# Patient Record
Sex: FEMALE | Race: WHITE | NOT HISPANIC OR LATINO | Employment: FULL TIME | URBAN - METROPOLITAN AREA
[De-identification: names, ages, dates, MRNs, and addresses within clinical notes are randomized per-mention and may not be internally consistent; named-entity substitution may affect disease eponyms.]

---

## 2019-07-15 DIAGNOSIS — Z34.90 EARLY STAGE OF PREGNANCY: Primary | ICD-10-CM

## 2019-07-16 ENCOUNTER — PROCEDURE VISIT (OUTPATIENT)
Dept: OBSTETRICS AND GYNECOLOGY | Facility: CLINIC | Age: 24
End: 2019-07-16
Payer: COMMERCIAL

## 2019-07-16 DIAGNOSIS — Z34.90 EARLY STAGE OF PREGNANCY: Primary | ICD-10-CM

## 2019-07-16 DIAGNOSIS — Z34.81 ENCOUNTER FOR SUPERVISION OF OTHER NORMAL PREGNANCY IN FIRST TRIMESTER: ICD-10-CM

## 2019-07-16 PROBLEM — N80.00 ENDOMETRIOSIS OF UTERUS: Status: ACTIVE | Noted: 2018-07-03

## 2019-07-16 PROCEDURE — 76817 TRANSVAGINAL US OBSTETRIC: CPT | Mod: S$GLB,,, | Performed by: OBSTETRICS & GYNECOLOGY

## 2019-07-16 PROCEDURE — 76817 PR US, OB, TRANSVAG APPROACH: ICD-10-PCS | Mod: S$GLB,,, | Performed by: OBSTETRICS & GYNECOLOGY

## 2019-07-17 LAB
AMPHETAMINES (500): NEGATIVE NG/ML
BARBITURATES (200): NEGATIVE NG/ML
BENZODIAZEPINES (300): NEGATIVE NG/ML
COCAINE (150): NEGATIVE NG/ML
MARIJUANA, THC (50): NEGATIVE NG/ML
OPIATES (2000): NEGATIVE NG/ML
PH: 5.7 (ref 4.5–8)
PHENCYCLIDINE (25): NEGATIVE NG/ML
PROPOXYPHENE (300): NEGATIVE NG/ML
URINE CREATININE D/S: 164.9 MG/DL

## 2019-07-29 ENCOUNTER — ROUTINE PRENATAL (OUTPATIENT)
Dept: OBSTETRICS AND GYNECOLOGY | Facility: CLINIC | Age: 24
End: 2019-07-29
Payer: COMMERCIAL

## 2019-07-29 VITALS
WEIGHT: 134 LBS | HEART RATE: 79 BPM | HEIGHT: 62 IN | BODY MASS INDEX: 24.66 KG/M2 | DIASTOLIC BLOOD PRESSURE: 66 MMHG | SYSTOLIC BLOOD PRESSURE: 97 MMHG

## 2019-07-29 DIAGNOSIS — Z34.81 ENCOUNTER FOR SUPERVISION OF OTHER NORMAL PREGNANCY IN FIRST TRIMESTER: Primary | ICD-10-CM

## 2019-07-29 PROCEDURE — 0500F INITIAL PRENATAL CARE VISIT: CPT | Mod: S$GLB,,, | Performed by: OBSTETRICS & GYNECOLOGY

## 2019-07-29 PROCEDURE — 0500F PR INITIAL PRENATAL CARE VISIT: ICD-10-PCS | Mod: S$GLB,,, | Performed by: OBSTETRICS & GYNECOLOGY

## 2019-07-29 NOTE — PROGRESS NOTES
Subjective:       Patient ID: Dory Armas is a 24 y.o.  at 9w5d   Chief Complaint:  Initial Prenatal Visit      History of Present Illness  here for new ob exam.  Labs and history were reviewed with the patient today  No complaints      OB History  OB History    Para Term  AB Living   3 1 1   1 1   SAB TAB Ectopic Multiple Live Births   1       1      # Outcome Date GA Lbr Connor/2nd Weight Sex Delivery Anes PTL Lv   3 Current            2 Term 06/15/18 38w6d  2.835 kg (6 lb 4 oz) M Vag-Spont   MOE   1 SAB                Review of Systems  nml 1st trimester sx- sob, dec excercixe tolerance, fatigue and nausea  Neg for vag bleed, dc, vomiting, cp, lof, fever, chills, ns, visual changes, swelling, headaches, constipation/diarrhea, dysuria, freq/urgency of urination     Objective:     Vitals:    19 1310   BP: 97/66   Pulse: 79       NAD  NCAT  pupils normal size  Skin nml no rashes or lesions  No resp distress, resp even and unlabored  nt nd, no rebound no guarding  nml ext fem gent, normal cvx uterus and adnexa, no dc or bleeding  nml appearing rectum  No cyanosis or clubbing, edema appropriate for pregn    Uterus size approp for gest age         Assessment:        1. Encounter for supervision of other normal pregnancy in first trimester               Plan:      Encounter for supervision of other normal pregnancy in first trimester  -     Urine culture  -     CT/NG, T. vaginalis; Future; Expected date: 2019           Pain fever bleeding precautions  Encouraged PNV  rtc 4 wks

## 2019-07-30 LAB
ABS NRBC COUNT: 0 X 10 3/UL (ref 0–0.01)
ABSOLUTE BASOPHIL: 0.03 X 10 3/UL (ref 0–0.22)
ABSOLUTE EOSINOPHIL: 0.08 X 10 3/UL (ref 0.04–0.54)
ABSOLUTE IMMATURE GRAN: 0.05 X 10 3/UL (ref 0–0.04)
ABSOLUTE LYMPHOCYTE: 2.38 X 10 3/UL (ref 0.86–4.75)
ABSOLUTE MONOCYTE: 0.82 X 10 3/UL (ref 0.22–1.08)
ANTIBODY SCREEN: NEGATIVE
BASOPHILS NFR BLD: 0.3 %
BLOOD GROUPING: NORMAL
BLOOD TYPE (D): POSITIVE
EOSINOPHIL NFR BLD: 0.9 %
HBV SURFACE AG SERPL QL IA: NONREACTIVE
HCT VFR BLD AUTO: 40.3 % (ref 37–47)
HGB BLD-MCNC: 13.6 G/DL (ref 12–16)
HIV 1+2 AB+HIV1 P24 AG SERPL QL IA: NONREACTIVE
IMMATURE GRANULOCYTES: 0.5 % (ref 0–0.5)
LYMPHOCYTES NFR BLD: 25.4 %
MCH RBC QN AUTO: 29.8 PG (ref 27–32)
MCHC RBC AUTO-ENTMCNC: 33.7 G/DL (ref 32–36)
MCV RBC AUTO: 88.2 FL (ref 82–100)
MONOCYTES NFR BLD: 8.8 %
NEUTROPHILS ABSOLUTE COUNT: 6.01 X 10 3/UL (ref 2.15–7.56)
NEUTROPHILS NFR BLD: 64.1 %
NUCLEATED RED BLOOD CELLS: 0 /100 WBC (ref 0–0.2)
PLATELET # BLD AUTO: 358 X 10 3/UL (ref 135–400)
RBC # BLD AUTO: 4.57 X 10 6/UL (ref 4.2–5.4)
RDW-SD: 43 FL (ref 37–54)
RUBELLA IGG SCREEN: NORMAL
SYPHILIS TREPONEMAL ANTIBODY: NONREACTIVE
URINE CULTURE, ROUTINE: NORMAL
WBC # BLD: 9.37 X 10 3/UL (ref 4.3–10.8)

## 2019-07-31 LAB
CHLAMYDIA: NEGATIVE
GONORRHEA: NEGATIVE
SOURCE: NORMAL
SOURCE: NORMAL
TRICHOMONAS AMPLIFIED: NEGATIVE

## 2019-08-27 ENCOUNTER — ROUTINE PRENATAL (OUTPATIENT)
Dept: OBSTETRICS AND GYNECOLOGY | Facility: CLINIC | Age: 24
End: 2019-08-27
Payer: COMMERCIAL

## 2019-08-27 VITALS
WEIGHT: 134 LBS | SYSTOLIC BLOOD PRESSURE: 101 MMHG | DIASTOLIC BLOOD PRESSURE: 70 MMHG | HEART RATE: 93 BPM | BODY MASS INDEX: 24.51 KG/M2

## 2019-08-27 DIAGNOSIS — Z34.82 ENCOUNTER FOR SUPERVISION OF OTHER NORMAL PREGNANCY IN SECOND TRIMESTER: Primary | ICD-10-CM

## 2019-08-27 PROCEDURE — 0502F PR SUBSEQUENT PRENATAL CARE: ICD-10-PCS | Mod: CPTII,S$GLB,, | Performed by: OBSTETRICS & GYNECOLOGY

## 2019-08-27 PROCEDURE — 0502F SUBSEQUENT PRENATAL CARE: CPT | Mod: CPTII,S$GLB,, | Performed by: OBSTETRICS & GYNECOLOGY

## 2019-09-04 ENCOUNTER — ROUTINE PRENATAL (OUTPATIENT)
Dept: OBSTETRICS AND GYNECOLOGY | Facility: CLINIC | Age: 24
End: 2019-09-04
Payer: COMMERCIAL

## 2019-09-04 VITALS
BODY MASS INDEX: 24.69 KG/M2 | DIASTOLIC BLOOD PRESSURE: 61 MMHG | SYSTOLIC BLOOD PRESSURE: 94 MMHG | WEIGHT: 135 LBS | HEART RATE: 80 BPM

## 2019-09-04 DIAGNOSIS — O46.92 VAGINAL BLEEDING IN PREGNANCY, SECOND TRIMESTER: Primary | ICD-10-CM

## 2019-09-04 PROCEDURE — 0502F PR SUBSEQUENT PRENATAL CARE: ICD-10-PCS | Mod: CPTII,S$GLB,, | Performed by: OBSTETRICS & GYNECOLOGY

## 2019-09-04 PROCEDURE — 0502F SUBSEQUENT PRENATAL CARE: CPT | Mod: CPTII,S$GLB,, | Performed by: OBSTETRICS & GYNECOLOGY

## 2019-09-04 NOTE — PROGRESS NOTES
Subjective:       Patient ID: Dory Armas is a 24 y.o.  at 14w0d      Chief Complaint:  Routine Prenatal Visit      History of Present Illness  No complaints. Labs and history reviewed with pt.       Review of Systems  Denies n/v, f/c, dysuria, contractions,   VD, VB, round ligament pain, headaches       Objective:     Vitals:    19 1301   BP: 101/70   Pulse: 93     Wt Readings from Last 3 Encounters:   19 60.8 kg (134 lb)   19 60.8 kg (134 lb)        nad  NCAT  pupils normal size  Skin nml no rashes or lesions  No resp distress, resp even and unlabored  Gravid nt, no rebound no guarding  No cyanosis or clubbing, edema appropriate for pregn    FHT: 140s        Assessment:        1. Encounter for supervision of other normal pregnancy in second trimester                Plan:        Encouraged PNV  Pain, fever, bleeding precautions   RTC 4 weeks

## 2019-09-04 NOTE — PROGRESS NOTES
Subjective:       Patient ID: Dory Armas is a 24 y.o.  at 15w0d     Chief Complaint:  Routine Prenatal Visit      History of Present Illness  Presents for vaginal bleeding x 2 days. + cramping. Seen in ED with + FHTs and no evidence of SAB.       GYN & OB History  OB History    Para Term  AB Living   3 1 1   1 1   SAB TAB Ectopic Multiple Live Births   1       1      # Outcome Date GA Lbr Connor/2nd Weight Sex Delivery Anes PTL Lv   3 Current            2 Term 06/15/18 38w6d  2.835 kg (6 lb 4 oz) M Vag-Spont   MOE   1 SAB                  Review of Systems  Denies n/v, f/c, dysuria, contractions, round ligament pain, headaches       Objective:    Physical Exam:   Constitutional: She is oriented to person, place, and time. She appears well-developed and well-nourished. No distress.    HENT:   Head: Normocephalic and atraumatic.      Cardiovascular: Normal rate and regular rhythm.  Exam reveals no clubbing, no cyanosis and no edema.          Abdominal: Soft. Bowel sounds are normal. She exhibits no distension. There is no tenderness. There is no rebound and no guarding.     Genitourinary: Uterus is not tender. Cervix is normal. There is bleeding in the vagina. No erythema in the vagina. No vaginal discharge found. Cervix exhibits no motion tenderness and no friability.           Musculoskeletal: She exhibits no edema.       Neurological: She is alert and oriented to person, place, and time.    Skin: Skin is warm and dry. No cyanosis or erythema. Nails show no clubbing.    Psychiatric: She has a normal mood and affect. Her behavior is normal.              FHT: 160s     US: Normal FHTS, small subchorionic hemorrhage. Low lying placenta   Assessment:        1. Vaginal bleeding in pregnancy, second trimester                Plan:      US with subchorionic hemorrhage    RH confirmed   Pain, fever, bleeding precautions   RTC 2 weeks

## 2019-09-19 ENCOUNTER — ROUTINE PRENATAL (OUTPATIENT)
Dept: OBSTETRICS AND GYNECOLOGY | Facility: CLINIC | Age: 24
End: 2019-09-19
Payer: COMMERCIAL

## 2019-09-19 VITALS
HEART RATE: 80 BPM | BODY MASS INDEX: 24.87 KG/M2 | WEIGHT: 136 LBS | DIASTOLIC BLOOD PRESSURE: 66 MMHG | SYSTOLIC BLOOD PRESSURE: 99 MMHG

## 2019-09-19 DIAGNOSIS — Z34.82 ENCOUNTER FOR SUPERVISION OF OTHER NORMAL PREGNANCY IN SECOND TRIMESTER: Primary | ICD-10-CM

## 2019-09-19 PROCEDURE — 0502F PR SUBSEQUENT PRENATAL CARE: ICD-10-PCS | Mod: CPTII,S$GLB,, | Performed by: OBSTETRICS & GYNECOLOGY

## 2019-09-19 PROCEDURE — 0502F SUBSEQUENT PRENATAL CARE: CPT | Mod: CPTII,S$GLB,, | Performed by: OBSTETRICS & GYNECOLOGY

## 2019-09-19 RX ORDER — FERROUS SULFATE 325(65) MG
325 TABLET ORAL
COMMUNITY
End: 2021-01-05

## 2019-09-19 RX ORDER — HYDROXYZINE HYDROCHLORIDE 50 MG/1
50 TABLET, FILM COATED ORAL 4 TIMES DAILY
Qty: 30 TABLET | Refills: 5 | Status: SHIPPED | OUTPATIENT
Start: 2019-09-19 | End: 2020-06-19

## 2019-09-19 NOTE — PROGRESS NOTES
Subjective:       Patient ID: Dory Armas is a 24 y.o.  at 17w1d      Chief Complaint:  Routine Prenatal Visit      History of Present Illness  No complaints. Still spotting occasionally. Labs and history reviewed with pt.       Review of Systems  Denies n/v, f/c, dysuria, contractions,   VD, VB, round ligament pain, headaches       Objective:     Vitals:    19 1307   BP: 99/66   Pulse: 80     Wt Readings from Last 3 Encounters:   19 61.7 kg (136 lb)   19 61.2 kg (135 lb)   19 60.8 kg (134 lb)        nad  NCAT  pupils normal size  Skin nml no rashes or lesions  No resp distress, resp even and unlabored  Gravid nt, no rebound no guarding  No cyanosis or clubbing, edema appropriate for pregn    FHT: 140s        Assessment:        1. Encounter for supervision of other normal pregnancy in second trimester                Plan:      Prenatal testing - quad today   Anatomy on RTC   Encouraged PNV  Pain, fever, bleeding precautions   RTC 4 weeks

## 2019-09-30 LAB
ABS NRBC COUNT: 0 X 10 3/UL (ref 0–0.01)
ABSOLUTE BASOPHIL: 0.03 X 10 3/UL (ref 0–0.22)
ABSOLUTE EOSINOPHIL: 0.08 X 10 3/UL (ref 0.04–0.54)
ABSOLUTE IMMATURE GRAN: 0.05 X 10 3/UL (ref 0–0.04)
ABSOLUTE LYMPHOCYTE: 1.95 X 10 3/UL (ref 0.86–4.75)
ABSOLUTE MONOCYTE: 0.67 X 10 3/UL (ref 0.22–1.08)
BASOPHILS NFR BLD: 0.3 % (ref 0.2–1.2)
EOSINOPHIL NFR BLD: 0.9 % (ref 0.7–7)
HCT VFR BLD AUTO: 35.7 % (ref 37–47)
HGB BLD-MCNC: 11.9 G/DL (ref 12–16)
IMMATURE GRANULOCYTES: 0.6 % (ref 0–0.5)
LYMPHOCYTES NFR BLD: 22.5 % (ref 19.3–53.1)
Lab: NORMAL
MCH RBC QN AUTO: 29.9 PG (ref 27–32)
MCHC RBC AUTO-ENTMCNC: 33.3 G/DL (ref 32–36)
MCV RBC AUTO: 89.7 FL (ref 82–100)
MONOCYTES NFR BLD: 7.7 % (ref 4.7–12.5)
NEUTROPHILS ABSOLUTE COUNT: 5.9 X 10 3/UL (ref 2.15–7.56)
NEUTROPHILS NFR BLD: 68 %
NUCLEATED RED BLOOD CELLS: 0 /100 WBC (ref 0–0.2)
PLATELET # BLD AUTO: 369 X 10 3/UL (ref 135–400)
RBC # BLD AUTO: 3.98 X 10 6/UL (ref 4.2–5.4)
RDW-SD: 43.1 FL (ref 37–54)
WBC # BLD: 8.68 X 10 3/UL (ref 4.3–10.8)

## 2019-10-01 LAB
AFP MOM: 1.14
AFP, SERUM: 57.5 NG/ML
AGE RISK DOWN SYNDROME: NORMAL
CALC'D GESTATIONAL AGE: 18.7 WEEKS
CIGARETTE SMOKER: NO
COMMENT: NORMAL
DONOR AGE: EGG RETRIEVAL: NORMAL
DONOR EGG: NO
EDD DETERMINED BY: NORMAL
EST'D DATE OF DELIVERY: NORMAL
ESTRIOL MOM: 1.45
ESTRIOL, FREE: 2.16 NG/ML
HCG MOM: 1.04
HCG, SERUM: 24.45 IU/ML
HX OF NEURAL TUBE DEFECTS: NO
INHIBIN A MOM: 1.17
INHIBIN A, DIMERIC: 210 PG/ML
INSULIN DEPEND DIABETIC: NO
INTERPRETATION: NORMAL
Lab: NORMAL
MATERNAL WEIGHT: 136 LBS
MOTHER'S ETHNIC ORIGIN: NORMAL
MSS DOWN SYNDROME RISK: NORMAL
MSS TRISOMY 18 RISK: NORMAL
NUMBER OF FETUSES: 1
OTHER ETHNIC INFORMATION: NORMAL
PREV PREGNANCY DOWN SYND: NO
REPEAT SPECIMEN: NO
RISK FOR ONTD: NORMAL

## 2019-10-09 ENCOUNTER — ROUTINE PRENATAL (OUTPATIENT)
Dept: OBSTETRICS AND GYNECOLOGY | Facility: CLINIC | Age: 24
End: 2019-10-09
Payer: COMMERCIAL

## 2019-10-09 VITALS
DIASTOLIC BLOOD PRESSURE: 66 MMHG | HEART RATE: 90 BPM | SYSTOLIC BLOOD PRESSURE: 101 MMHG | BODY MASS INDEX: 25.06 KG/M2 | WEIGHT: 137 LBS

## 2019-10-09 DIAGNOSIS — Z34.82 ENCOUNTER FOR SUPERVISION OF OTHER NORMAL PREGNANCY IN SECOND TRIMESTER: ICD-10-CM

## 2019-10-09 DIAGNOSIS — O46.92 VAGINAL BLEEDING IN PREGNANCY, SECOND TRIMESTER: Primary | ICD-10-CM

## 2019-10-09 PROCEDURE — 76815 OB US LIMITED FETUS(S): CPT | Mod: S$GLB,,, | Performed by: OBSTETRICS & GYNECOLOGY

## 2019-10-09 PROCEDURE — 90471 FLU VACCINE (QUAD) GREATER THAN OR EQUAL TO 3YO PRESERVATIVE FREE IM: ICD-10-PCS | Mod: S$GLB,,, | Performed by: OBSTETRICS & GYNECOLOGY

## 2019-10-09 PROCEDURE — 0502F SUBSEQUENT PRENATAL CARE: CPT | Mod: CPTII,S$GLB,, | Performed by: OBSTETRICS & GYNECOLOGY

## 2019-10-09 PROCEDURE — 90686 FLU VACCINE (QUAD) GREATER THAN OR EQUAL TO 3YO PRESERVATIVE FREE IM: ICD-10-PCS | Mod: S$GLB,,, | Performed by: OBSTETRICS & GYNECOLOGY

## 2019-10-09 PROCEDURE — 0502F PR SUBSEQUENT PRENATAL CARE: ICD-10-PCS | Mod: CPTII,S$GLB,, | Performed by: OBSTETRICS & GYNECOLOGY

## 2019-10-09 PROCEDURE — 90471 IMMUNIZATION ADMIN: CPT | Mod: S$GLB,,, | Performed by: OBSTETRICS & GYNECOLOGY

## 2019-10-09 PROCEDURE — 90686 IIV4 VACC NO PRSV 0.5 ML IM: CPT | Mod: S$GLB,,, | Performed by: OBSTETRICS & GYNECOLOGY

## 2019-10-09 PROCEDURE — 76815 PR  US,PREGNANT UTERUS,LIMITED, 1/> FETUSES: ICD-10-PCS | Mod: S$GLB,,, | Performed by: OBSTETRICS & GYNECOLOGY

## 2019-10-09 NOTE — PROGRESS NOTES
Subjective:       Patient ID: Dory Armas is a 24 y.o.  at 20w0d     Chief Complaint:  Routine Prenatal Visit      History of Present Illness  Presents for vaginal bleeding x 1 day.  Denies cramping. Denies intercourse.       Review of Systems  Denies n/v, f/c, dysuria, contractions, round ligament pain, headaches       Objective:    Physical Exam:   Constitutional: She is oriented to person, place, and time. She appears well-developed and well-nourished. No distress.    HENT:   Head: Normocephalic and atraumatic.      Cardiovascular: Normal rate and regular rhythm.  Exam reveals no clubbing, no cyanosis and no edema.          Abdominal: Soft. Bowel sounds are normal. She exhibits no distension. There is no tenderness. There is no rebound and no guarding.     Genitourinary: Uterus is not tender. Cervix is normal. There is bleeding in the vagina. No erythema in the vagina. No vaginal discharge found. Cervix exhibits no motion tenderness and no friability.           Musculoskeletal: She exhibits no edema.       Neurological: She is alert and oriented to person, place, and time.    Skin: Skin is warm and dry. No cyanosis or erythema. Nails show no clubbing.    Psychiatric: She has a normal mood and affect. Her behavior is normal.            FHT: 150s    US: normal. FHTs present. No evidence of subchorionic hemorrhage     Assessment:        1. Vaginal bleeding in pregnancy, second trimester                Plan:      Pt given reassurance about bleeding   RH confirmed   Pain, fever, bleeding precautions   RTC 2 weeks

## 2019-10-21 DIAGNOSIS — Z34.82 ENCOUNTER FOR SUPERVISION OF OTHER NORMAL PREGNANCY IN SECOND TRIMESTER: Primary | ICD-10-CM

## 2019-10-23 ENCOUNTER — PROCEDURE VISIT (OUTPATIENT)
Dept: OBSTETRICS AND GYNECOLOGY | Facility: CLINIC | Age: 24
End: 2019-10-23
Payer: COMMERCIAL

## 2019-10-23 DIAGNOSIS — Z34.82 ENCOUNTER FOR SUPERVISION OF OTHER NORMAL PREGNANCY IN SECOND TRIMESTER: ICD-10-CM

## 2019-10-23 PROCEDURE — 76805 OB US >/= 14 WKS SNGL FETUS: CPT | Mod: S$GLB,,, | Performed by: OBSTETRICS & GYNECOLOGY

## 2019-10-23 PROCEDURE — 76805 PR US, OB 14+WKS, TRANSABD, SINGLE GESTATION: ICD-10-PCS | Mod: S$GLB,,, | Performed by: OBSTETRICS & GYNECOLOGY

## 2019-11-07 ENCOUNTER — ROUTINE PRENATAL (OUTPATIENT)
Dept: OBSTETRICS AND GYNECOLOGY | Facility: CLINIC | Age: 24
End: 2019-11-07
Payer: COMMERCIAL

## 2019-11-07 VITALS
BODY MASS INDEX: 25.06 KG/M2 | HEART RATE: 97 BPM | WEIGHT: 137 LBS | SYSTOLIC BLOOD PRESSURE: 90 MMHG | DIASTOLIC BLOOD PRESSURE: 58 MMHG

## 2019-11-07 DIAGNOSIS — Z34.82 ENCOUNTER FOR SUPERVISION OF OTHER NORMAL PREGNANCY IN SECOND TRIMESTER: Primary | ICD-10-CM

## 2019-11-07 PROCEDURE — 0502F SUBSEQUENT PRENATAL CARE: CPT | Mod: CPTII,S$GLB,, | Performed by: OBSTETRICS & GYNECOLOGY

## 2019-11-07 PROCEDURE — 0502F PR SUBSEQUENT PRENATAL CARE: ICD-10-PCS | Mod: CPTII,S$GLB,, | Performed by: OBSTETRICS & GYNECOLOGY

## 2019-11-07 NOTE — PROGRESS NOTES
Subjective:       Patient ID: Dory Armas is a 24 y.o.  at 24w1d      Chief Complaint:  Routine Prenatal Visit      History of Present Illness  No complaints. Labs and history reviewed with pt.       Review of Systems  Denies n/v, f/c, dysuria, contractions,   VD, VB, round ligament pain, headaches       Objective:     Vitals:    19 1102   BP: (!) 90/58   Pulse: 97     Wt Readings from Last 3 Encounters:   19 62.1 kg (137 lb)   10/09/19 62.1 kg (137 lb)   19 61.7 kg (136 lb)        nad  NCAT  pupils normal size  Skin nml no rashes or lesions  No resp distress, resp even and unlabored  Gravid nt, no rebound no guarding  No cyanosis or clubbing, edema appropriate for pregn  FH 24cm   FHT: 140s        Assessment:      No diagnosis found.            Plan:        O'Sul, 3rd tri labs, tdap on RTC    Encouraged PNV  Pain, fever, bleeding precautions   RTC 4 weeks

## 2019-12-05 DIAGNOSIS — Z34.83 ENCOUNTER FOR SUPERVISION OF OTHER NORMAL PREGNANCY IN THIRD TRIMESTER: Primary | ICD-10-CM

## 2019-12-06 ENCOUNTER — ROUTINE PRENATAL (OUTPATIENT)
Dept: OBSTETRICS AND GYNECOLOGY | Facility: CLINIC | Age: 24
End: 2019-12-06
Payer: COMMERCIAL

## 2019-12-06 VITALS
BODY MASS INDEX: 25.97 KG/M2 | DIASTOLIC BLOOD PRESSURE: 67 MMHG | WEIGHT: 142 LBS | HEART RATE: 106 BPM | SYSTOLIC BLOOD PRESSURE: 104 MMHG

## 2019-12-06 DIAGNOSIS — Z34.83 ENCOUNTER FOR SUPERVISION OF OTHER NORMAL PREGNANCY IN THIRD TRIMESTER: Primary | ICD-10-CM

## 2019-12-06 PROCEDURE — 90715 TDAP VACCINE 7 YRS/> IM: CPT | Mod: S$GLB,,, | Performed by: OBSTETRICS & GYNECOLOGY

## 2019-12-06 PROCEDURE — 90471 TDAP VACCINE GREATER THAN OR EQUAL TO 7YO IM: ICD-10-PCS | Mod: S$GLB,,, | Performed by: OBSTETRICS & GYNECOLOGY

## 2019-12-06 PROCEDURE — 0502F PR SUBSEQUENT PRENATAL CARE: ICD-10-PCS | Mod: CPTII,S$GLB,, | Performed by: OBSTETRICS & GYNECOLOGY

## 2019-12-06 PROCEDURE — 90715 TDAP VACCINE GREATER THAN OR EQUAL TO 7YO IM: ICD-10-PCS | Mod: S$GLB,,, | Performed by: OBSTETRICS & GYNECOLOGY

## 2019-12-06 PROCEDURE — 0502F SUBSEQUENT PRENATAL CARE: CPT | Mod: CPTII,S$GLB,, | Performed by: OBSTETRICS & GYNECOLOGY

## 2019-12-06 PROCEDURE — 90471 IMMUNIZATION ADMIN: CPT | Mod: S$GLB,,, | Performed by: OBSTETRICS & GYNECOLOGY

## 2019-12-06 NOTE — PROGRESS NOTES
Subjective:       Patient ID: Dory Armsa is a 24 y.o.  at 28w2d      Chief Complaint:  Routine Prenatal Visit      History of Present Illness  No complaints. Labs and history reviewed with pt.       Review of Systems  Denies n/v, f/c, dysuria, contractions,   VD, VB, round ligament pain, headaches       Objective:     Vitals:    19 1020   BP: 104/67   Pulse: 106     Wt Readings from Last 3 Encounters:   19 64.4 kg (142 lb)   19 62.1 kg (137 lb)   10/09/19 62.1 kg (137 lb)        nad  NCAT  pupils normal size  Skin nml no rashes or lesions  No resp distress, resp even and unlabored  Gravid nt, no rebound no guarding  No cyanosis or clubbing, edema appropriate for pregn  FH 28cm   FHT: 140s        Assessment:        1. Encounter for supervision of other normal pregnancy in third trimester                Plan:        O'Sul, 3rd tri labs, tdap today   Encouraged PNV  Pain, fever, bleeding precautions   RTC 4 weeks

## 2019-12-19 ENCOUNTER — ROUTINE PRENATAL (OUTPATIENT)
Dept: OBSTETRICS AND GYNECOLOGY | Facility: CLINIC | Age: 24
End: 2019-12-19
Payer: COMMERCIAL

## 2019-12-19 VITALS
HEART RATE: 102 BPM | SYSTOLIC BLOOD PRESSURE: 96 MMHG | BODY MASS INDEX: 27.44 KG/M2 | WEIGHT: 150 LBS | DIASTOLIC BLOOD PRESSURE: 62 MMHG

## 2019-12-19 DIAGNOSIS — Z34.83 ENCOUNTER FOR SUPERVISION OF OTHER NORMAL PREGNANCY IN THIRD TRIMESTER: Primary | ICD-10-CM

## 2019-12-19 PROCEDURE — 0502F PR SUBSEQUENT PRENATAL CARE: ICD-10-PCS | Mod: CPTII,S$GLB,, | Performed by: OBSTETRICS & GYNECOLOGY

## 2019-12-19 PROCEDURE — 0502F SUBSEQUENT PRENATAL CARE: CPT | Mod: CPTII,S$GLB,, | Performed by: OBSTETRICS & GYNECOLOGY

## 2019-12-19 NOTE — PROGRESS NOTES
Subjective:       Patient ID: Dory Armas is a 24 y.o.  at 28w2d      Chief Complaint:  Routine Prenatal Visit      History of Present Illness  No complaints. Labs and history reviewed with pt.       Review of Systems  Denies n/v, f/c, dysuria, contractions,   VD, VB, round ligament pain, headaches       Objective:     Vitals:    19 1414   BP: 96/62   Pulse: 102     Wt Readings from Last 3 Encounters:   19 68 kg (150 lb)   19 64.4 kg (142 lb)   19 62.1 kg (137 lb)        nad  NCAT  pupils normal size  Skin nml no rashes or lesions  No resp distress, resp even and unlabored  Gravid nt, no rebound no guarding  No cyanosis or clubbing, edema appropriate for pregn  FH 30cm   FHT: 140s        Assessment:        No diagnosis found.            Plan:        O'Sul, 3rd tri labs today   Encouraged PNV  Pain, fever, bleeding precautions   RTC 4 weeks

## 2020-01-07 ENCOUNTER — ROUTINE PRENATAL (OUTPATIENT)
Dept: OBSTETRICS AND GYNECOLOGY | Facility: CLINIC | Age: 25
End: 2020-01-07
Payer: COMMERCIAL

## 2020-01-07 VITALS
DIASTOLIC BLOOD PRESSURE: 68 MMHG | SYSTOLIC BLOOD PRESSURE: 103 MMHG | HEART RATE: 98 BPM | BODY MASS INDEX: 28.17 KG/M2 | WEIGHT: 154 LBS

## 2020-01-07 DIAGNOSIS — Z34.83 ENCOUNTER FOR SUPERVISION OF OTHER NORMAL PREGNANCY IN THIRD TRIMESTER: Primary | ICD-10-CM

## 2020-01-07 PROCEDURE — 0502F SUBSEQUENT PRENATAL CARE: CPT | Mod: CPTII,S$GLB,, | Performed by: OBSTETRICS & GYNECOLOGY

## 2020-01-07 PROCEDURE — 0502F PR SUBSEQUENT PRENATAL CARE: ICD-10-PCS | Mod: CPTII,S$GLB,, | Performed by: OBSTETRICS & GYNECOLOGY

## 2020-01-09 NOTE — PROGRESS NOTES
Subjective:       Patient ID: Dory Armas is a 24 y.o.  at 32w6d      Chief Complaint:  Routine Prenatal Visit      History of Present Illness  No complaints. Labs and history reviewed with pt.       Review of Systems  Denies n/v, f/c, dysuria, contractions,   VD, VB, round ligament pain, headaches       Objective:     Vitals:    20 1424   BP: 103/68   Pulse: 98     Wt Readings from Last 3 Encounters:   20 69.9 kg (154 lb)   19 68 kg (150 lb)   19 64.4 kg (142 lb)        nad  NCAT  pupils normal size  Skin nml no rashes or lesions  No resp distress, resp even and unlabored  Gravid nt, no rebound no guarding  No cyanosis or clubbing, edema appropriate for pregn  FH 33cm   FHT: 140s        Assessment:        1. Encounter for supervision of other normal pregnancy in third trimester                Plan:        O'Sul, 3rd tri labs today   Encouraged PNV  Pain, fever, bleeding precautions   RTC 2 weeks

## 2020-01-21 ENCOUNTER — ROUTINE PRENATAL (OUTPATIENT)
Dept: OBSTETRICS AND GYNECOLOGY | Facility: CLINIC | Age: 25
End: 2020-01-21
Payer: COMMERCIAL

## 2020-01-21 VITALS
SYSTOLIC BLOOD PRESSURE: 98 MMHG | BODY MASS INDEX: 28.53 KG/M2 | WEIGHT: 156 LBS | DIASTOLIC BLOOD PRESSURE: 64 MMHG | HEART RATE: 98 BPM

## 2020-01-21 DIAGNOSIS — Z34.83 ENCOUNTER FOR SUPERVISION OF OTHER NORMAL PREGNANCY IN THIRD TRIMESTER: Primary | ICD-10-CM

## 2020-01-21 LAB
ABS NRBC COUNT: 0 X 10 3/UL (ref 0–0.01)
ABSOLUTE BASOPHIL: 0.02 X 10 3/UL (ref 0–0.22)
ABSOLUTE EOSINOPHIL: 0.09 X 10 3/UL (ref 0.04–0.54)
ABSOLUTE IMMATURE GRAN: 0.06 X 10 3/UL (ref 0–0.04)
ABSOLUTE LYMPHOCYTE: 1.73 X 10 3/UL (ref 0.86–4.75)
ABSOLUTE MONOCYTE: 0.63 X 10 3/UL (ref 0.22–1.08)
BASOPHILS NFR BLD: 0.2 % (ref 0.2–1.2)
EOSINOPHIL NFR BLD: 1.1 % (ref 0.7–7)
GLUCOSE 1 HR POST 50 GM: 104 MG/DL
HCT VFR BLD AUTO: 30.7 % (ref 37–47)
HGB BLD-MCNC: 10 G/DL (ref 12–16)
HIV 1+2 AB+HIV1 P24 AG SERPL QL IA: NONREACTIVE
IMMATURE GRANULOCYTES: 0.7 % (ref 0–0.5)
LYMPHOCYTES NFR BLD: 20.7 % (ref 19.3–53.1)
MCH RBC QN AUTO: 29.3 PG (ref 27–32)
MCHC RBC AUTO-ENTMCNC: 32.6 G/DL (ref 32–36)
MCV RBC AUTO: 90 FL (ref 82–100)
MONOCYTES NFR BLD: 7.6 % (ref 4.7–12.5)
NEUTROPHILS ABSOLUTE COUNT: 5.81 X 10 3/UL (ref 2.15–7.56)
NEUTROPHILS NFR BLD: 69.7 %
NUCLEATED RED BLOOD CELLS: 0 /100 WBC (ref 0–0.2)
PLATELET # BLD AUTO: 349 X 10 3/UL (ref 135–400)
RBC # BLD AUTO: 3.41 X 10 6/UL (ref 4.2–5.4)
RDW-SD: 44.8 FL (ref 37–54)
SYPHILIS TREPONEMAL ANTIBODY: NONREACTIVE
WBC # BLD: 8.34 X 10 3/UL (ref 4.3–10.8)

## 2020-01-21 PROCEDURE — 0502F PR SUBSEQUENT PRENATAL CARE: ICD-10-PCS | Mod: CPTII,S$GLB,, | Performed by: OBSTETRICS & GYNECOLOGY

## 2020-01-21 PROCEDURE — 0502F SUBSEQUENT PRENATAL CARE: CPT | Mod: CPTII,S$GLB,, | Performed by: OBSTETRICS & GYNECOLOGY

## 2020-01-21 NOTE — PROGRESS NOTES
Subjective:       Patient ID: Dory Armas is a 24 y.o.  at 34w6d      Chief Complaint:  Routine Prenatal Visit      History of Present Illness  No complaints. Labs and history reviewed with pt.       Review of Systems  Denies n/v, f/c, dysuria, contractions,   VD, VB, round ligament pain, headaches       Objective:     Vitals:    20 1417   BP: 98/64   Pulse: 98     Wt Readings from Last 3 Encounters:   20 70.8 kg (156 lb)   20 69.9 kg (154 lb)   19 68 kg (150 lb)        nad  NCAT  pupils normal size  Skin nml no rashes or lesions  No resp distress, resp even and unlabored  Gravid nt, no rebound no guarding  No cyanosis or clubbing, edema appropriate for pregn  FH 34cm   FHT: 140s        Assessment:        No diagnosis found.            Plan:        Encouraged PNV  Pain, fever, bleeding precautions   RTC 2 weeks

## 2020-01-31 ENCOUNTER — ROUTINE PRENATAL (OUTPATIENT)
Dept: OBSTETRICS AND GYNECOLOGY | Facility: CLINIC | Age: 25
End: 2020-01-31
Payer: COMMERCIAL

## 2020-01-31 VITALS
SYSTOLIC BLOOD PRESSURE: 94 MMHG | HEART RATE: 96 BPM | DIASTOLIC BLOOD PRESSURE: 60 MMHG | BODY MASS INDEX: 29.08 KG/M2 | WEIGHT: 159 LBS

## 2020-01-31 DIAGNOSIS — Z34.83 ENCOUNTER FOR SUPERVISION OF OTHER NORMAL PREGNANCY IN THIRD TRIMESTER: Primary | ICD-10-CM

## 2020-01-31 PROCEDURE — 0502F PR SUBSEQUENT PRENATAL CARE: ICD-10-PCS | Mod: CPTII,S$GLB,, | Performed by: OBSTETRICS & GYNECOLOGY

## 2020-01-31 PROCEDURE — 0502F SUBSEQUENT PRENATAL CARE: CPT | Mod: CPTII,S$GLB,, | Performed by: OBSTETRICS & GYNECOLOGY

## 2020-01-31 NOTE — PROGRESS NOTES
Subjective:       Patient ID: Dory Armas is a 24 y.o.  at 36w2d      Chief Complaint:  Routine Prenatal Visit      History of Present Illness  No complaints. Labs and history reviewed with pt.       Review of Systems  Denies n/v, f/c, dysuria, contractions,   VD, VB, round ligament pain, headaches       Objective:     Vitals:    20 1130   BP: 94/60   Pulse: 96     Wt Readings from Last 3 Encounters:   20 72.1 kg (159 lb)   20 70.8 kg (156 lb)   20 69.9 kg (154 lb)        nad  NCAT  pupils normal size  Skin nml no rashes or lesions  No resp distress, resp even and unlabored  Gravid nt, no rebound no guarding  No cyanosis or clubbing, edema appropriate for pregn  FH 36cm   FHT: 140s        Cvx: 2//high     Assessment:        1. Encounter for supervision of other normal pregnancy in third trimester                Plan:        Encouraged PNV  Pain, fever, bleeding precautions   RTC 1 weeks

## 2020-02-05 ENCOUNTER — ROUTINE PRENATAL (OUTPATIENT)
Dept: OBSTETRICS AND GYNECOLOGY | Facility: CLINIC | Age: 25
End: 2020-02-05
Payer: COMMERCIAL

## 2020-02-05 VITALS
BODY MASS INDEX: 28.94 KG/M2 | DIASTOLIC BLOOD PRESSURE: 72 MMHG | SYSTOLIC BLOOD PRESSURE: 103 MMHG | WEIGHT: 158.19 LBS | HEART RATE: 96 BPM

## 2020-02-05 DIAGNOSIS — Z34.83 ENCOUNTER FOR SUPERVISION OF OTHER NORMAL PREGNANCY IN THIRD TRIMESTER: Primary | ICD-10-CM

## 2020-02-05 PROCEDURE — 0502F PR SUBSEQUENT PRENATAL CARE: ICD-10-PCS | Mod: CPTII,S$GLB,, | Performed by: OBSTETRICS & GYNECOLOGY

## 2020-02-05 PROCEDURE — 0502F SUBSEQUENT PRENATAL CARE: CPT | Mod: CPTII,S$GLB,, | Performed by: OBSTETRICS & GYNECOLOGY

## 2020-02-05 NOTE — PROGRESS NOTES
Subjective:       Patient ID: Dory Armas is a 24 y.o.  at 37w0d      Chief Complaint:  Routine Prenatal Visit      History of Present Illness  No complaints. Labs and history reviewed with pt.       Review of Systems  Denies n/v, f/c, dysuria, contractions,   VD, VB, round ligament pain, headaches       Objective:     Vitals:    20 1259   BP: 103/72   Pulse: 96     Wt Readings from Last 3 Encounters:   20 71.8 kg (158 lb 3.2 oz)   20 72.1 kg (159 lb)   20 70.8 kg (156 lb)        nad  NCAT  pupils normal size  Skin nml no rashes or lesions  No resp distress, resp even and unlabored  Gravid nt, no rebound no guarding  No cyanosis or clubbing, edema appropriate for pregn  FH 36cm   FHT: 140s        Cvx: 3/th/high     Assessment:        1. Encounter for supervision of other normal pregnancy in third trimester                Plan:      Membranes stripped   Encouraged PNV  Pain, fever, bleeding precautions   RTC 1 weeks

## 2020-02-06 LAB
APPEARANCE, UA: ABNORMAL
BILIRUB UR QL STRIP: NEGATIVE MG/DL
COLOR UR: ABNORMAL
GLUCOSE (UA): NORMAL MG/DL
GROUP B STREP MOLECULAR: NEGATIVE
HGB UR QL STRIP: NEGATIVE /UL
KETONES UR QL STRIP: NEGATIVE MG/DL
LEUKOCYTE ESTERASE UR QL STRIP: NEGATIVE /UL
NITRITE UR QL STRIP: NEGATIVE
PENICILLIN ALLERGIC: NORMAL
PH UR STRIP: 7 PH (ref 5–9)
PROT UR QL STRIP: NEGATIVE MG/DL
SP GR UR STRIP: 1.01 (ref 1–1.03)
SPECIMEN COLLECTION METHOD, URINE: ABNORMAL
UROBILINOGEN UR STRIP-ACNC: NORMAL MG/DL

## 2020-02-10 ENCOUNTER — ROUTINE PRENATAL (OUTPATIENT)
Dept: OBSTETRICS AND GYNECOLOGY | Facility: CLINIC | Age: 25
End: 2020-02-10
Payer: COMMERCIAL

## 2020-02-10 ENCOUNTER — OUTSIDE PLACE OF SERVICE (OUTPATIENT)
Dept: OBSTETRICS AND GYNECOLOGY | Facility: CLINIC | Age: 25
End: 2020-02-10

## 2020-02-10 VITALS
WEIGHT: 156.38 LBS | BODY MASS INDEX: 28.61 KG/M2 | SYSTOLIC BLOOD PRESSURE: 107 MMHG | HEART RATE: 123 BPM | DIASTOLIC BLOOD PRESSURE: 75 MMHG

## 2020-02-10 DIAGNOSIS — Z34.83 ENCOUNTER FOR SUPERVISION OF OTHER NORMAL PREGNANCY IN THIRD TRIMESTER: Primary | ICD-10-CM

## 2020-02-10 PROCEDURE — 59400 PR FULL ROUT OBSTE CARE,VAGINAL DELIV: ICD-10-PCS | Mod: AT,,, | Performed by: OBSTETRICS & GYNECOLOGY

## 2020-02-10 PROCEDURE — 59400 OBSTETRICAL CARE: CPT | Mod: AT,,, | Performed by: OBSTETRICS & GYNECOLOGY

## 2020-02-10 PROCEDURE — 0502F PR SUBSEQUENT PRENATAL CARE: ICD-10-PCS | Mod: CPTII,S$GLB,, | Performed by: OBSTETRICS & GYNECOLOGY

## 2020-02-10 PROCEDURE — 0502F SUBSEQUENT PRENATAL CARE: CPT | Mod: CPTII,S$GLB,, | Performed by: OBSTETRICS & GYNECOLOGY

## 2020-02-10 NOTE — PROGRESS NOTES
Subjective:       Patient ID: Dory Armas is a 24 y.o.  at 37w5d      Chief Complaint:  Routine Prenatal Visit      History of Present Illness  No complaints. Labs and history reviewed with pt.       Review of Systems  Denies n/v, f/c, dysuria, contractions,   VD, VB, round ligament pain, headaches       Objective:     Vitals:    02/10/20 1349   BP: 107/75   Pulse: (!) 123     Wt Readings from Last 3 Encounters:   02/10/20 70.9 kg (156 lb 6.4 oz)   20 71.8 kg (158 lb 3.2 oz)   20 72.1 kg (159 lb)        nad  NCAT  pupils normal size  Skin nml no rashes or lesions  No resp distress, resp even and unlabored  Gravid nt, no rebound no guarding  No cyanosis or clubbing, edema appropriate for pregn  FH 36cm   FHT: 140s        Cvx: 6/50/high     Assessment:        1. Encounter for supervision of other normal pregnancy in third trimester                Plan:        To labor unit for labor augmentation

## 2020-02-11 PROCEDURE — 99024 PR POST-OP FOLLOW-UP VISIT: ICD-10-PCS | Mod: ,,, | Performed by: OBSTETRICS & GYNECOLOGY

## 2020-02-11 PROCEDURE — 99024 POSTOP FOLLOW-UP VISIT: CPT | Mod: ,,, | Performed by: OBSTETRICS & GYNECOLOGY

## 2020-03-10 ENCOUNTER — POSTPARTUM VISIT (OUTPATIENT)
Dept: OBSTETRICS AND GYNECOLOGY | Facility: CLINIC | Age: 25
End: 2020-03-10
Payer: COMMERCIAL

## 2020-03-10 VITALS
HEIGHT: 62 IN | BODY MASS INDEX: 26.43 KG/M2 | HEART RATE: 84 BPM | WEIGHT: 143.63 LBS | DIASTOLIC BLOOD PRESSURE: 55 MMHG | SYSTOLIC BLOOD PRESSURE: 104 MMHG

## 2020-03-10 PROCEDURE — 3008F PR BODY MASS INDEX (BMI) DOCUMENTED: ICD-10-PCS | Mod: CPTII,S$GLB,, | Performed by: OBSTETRICS & GYNECOLOGY

## 2020-03-10 PROCEDURE — 99213 OFFICE O/P EST LOW 20 MIN: CPT | Mod: 24,S$GLB,, | Performed by: OBSTETRICS & GYNECOLOGY

## 2020-03-10 PROCEDURE — 99213 PR OFFICE/OUTPT VISIT, EST, LEVL III, 20-29 MIN: ICD-10-PCS | Mod: 24,S$GLB,, | Performed by: OBSTETRICS & GYNECOLOGY

## 2020-03-10 PROCEDURE — 3008F BODY MASS INDEX DOCD: CPT | Mod: CPTII,S$GLB,, | Performed by: OBSTETRICS & GYNECOLOGY

## 2020-03-10 RX ORDER — SERTRALINE HYDROCHLORIDE 50 MG/1
50 TABLET, FILM COATED ORAL DAILY
Qty: 30 TABLET | Refills: 2 | Status: SHIPPED | OUTPATIENT
Start: 2020-03-10 | End: 2020-07-01

## 2020-03-10 NOTE — PROGRESS NOTES
Subjective:       Patient ID: Dory Armas 24 y.o.     Chief Complaint:  Pp depression       History of Present Illness  Pt presents for depression sx. States she had pp depression with her last delivery and was feeling similar sx. + depressed mood, difficulty sleeping, crying spells. Denies SI/HI.       Review of Systems  Review of Systems   Constitutional: Negative for activity change, appetite change, chills, diaphoresis and fever.   Eyes: Negative for visual disturbance.   Respiratory: Negative for shortness of breath and wheezing.    Cardiovascular: Negative for chest pain.   Gastrointestinal: Negative for blood in stool, constipation, diarrhea, nausea and vomiting.   Genitourinary: Negative for dysuria, hematuria and menorrhagia.   Neurological: Negative for headaches.   Psychiatric/Behavioral: + depression. The patient is not nervous/anxious.    Breast: Negative for lump, skin changes and tenderness          Objective:    Physical Exam:   Constitutional: She appears well-developed and well-nourished. No distress.    HENT:   Head: Normocephalic and atraumatic.      Cardiovascular: Exam reveals no clubbing and no cyanosis.     Pulmonary/Chest: Effort normal. No respiratory distress.        Abdominal: Soft.. She exhibits no distension. There is no rebound and no guarding.             Musculoskeletal: Normal range of motion and moves all extremeties.        Skin: Skin is warm and dry. She is not diaphoretic. No cyanosis or erythema. Nails show no clubbing.    Psychiatric: She has a normal mood and affect. Her behavior is normal.          Assessment:     SP   Pp depression         Plan:      Start zoloft   Start OCPs for contraception   rtc for 6 wk exam

## 2020-03-12 RX ORDER — NORETHINDRONE ACETATE AND ETHINYL ESTRADIOL .02; 1 MG/1; MG/1
1 TABLET ORAL DAILY
Qty: 30 TABLET | Refills: 11 | Status: SHIPPED | OUTPATIENT
Start: 2020-03-12 | End: 2020-12-23

## 2020-03-23 ENCOUNTER — OFFICE VISIT (OUTPATIENT)
Dept: OBSTETRICS AND GYNECOLOGY | Facility: CLINIC | Age: 25
End: 2020-03-23
Payer: COMMERCIAL

## 2020-03-23 PROCEDURE — 0503F POSTPARTUM CARE VISIT: CPT | Mod: 95,S$GLB,, | Performed by: OBSTETRICS & GYNECOLOGY

## 2020-03-23 PROCEDURE — 0503F PR POSTPARTUM CARE VISIT: ICD-10-PCS | Mod: 95,S$GLB,, | Performed by: OBSTETRICS & GYNECOLOGY

## 2020-03-23 RX ORDER — DICLOFENAC SODIUM 50 MG/1
50 TABLET, DELAYED RELEASE ORAL 2 TIMES DAILY
Qty: 30 TABLET | Refills: 5 | Status: SHIPPED | OUTPATIENT
Start: 2020-03-23 | End: 2022-03-17

## 2020-03-23 NOTE — PROGRESS NOTES
The patient location is: home   The chief complaint leading to consultation is: postpartum visit   Visit type: Virtual visit with synchronous audio and video  Total time spent with patient: 10 mins   Each patient to whom he or she provides medical services by telemedicine is:  (1) informed of the relationship between the physician and patient and the respective role of any other health care provider with respect to management of the patient; and (2) notified that he or she may decline to receive medical services by telemedicine and may withdraw from such care at any time.        Subjective:       Patient ID: Dory Armas is a 24 y.o. female.    Chief Complaint:  No chief complaint on file.      History of Present Illness  Here for 6 wk pp exam sp    Complaints none. Started zoloft last visit for pp depression. Sx improved, denies SI/HI     Review of Systems  Review of Systems   Constitutional: Negative for activity change, appetite change, chills, diaphoresis and fever.   Respiratory: Negative for shortness of breath.    Cardiovascular: Negative for chest pain.   Gastrointestinal: Negative for abdominal pain, bloating, constipation, nausea and vomiting.   Genitourinary: Negative for dysuria, flank pain, hematuria and menorrhagia.   Integumentary:  Negative for breast mass, breast skin changes and breast tenderness.   Neurological: Negative for headaches.   Psychiatric/Behavioral: Negative for depression. The patient is not nervous/anxious.    Breast: Negative for lump, mass, mastodynia, skin changes and tenderness          Objective:    Physical Exam:   Constitutional: She appears well-developed and well-nourished. No distress.    HENT:   Head: Normocephalic.    Eyes: Conjunctivae and EOM are normal.    Neck: Normal range of motion. No tracheal deviation present. No thyromegaly present.     Pulmonary/Chest: Effort normal. No respiratory distress.                  Musculoskeletal: Normal range of motion and moves  all extremeties.        Skin: No rash noted. She is not diaphoretic.    Psychiatric: She has a normal mood and affect. Her behavior is normal. Judgment and thought content normal.          Assessment:     Postpartum  Depression screen nml  Dysmenorrhea   bottle feeding     Plan:   rtc for annual or prn  Contraception OCPs   Diclofenac for pain   Preventative screening utd

## 2020-05-07 ENCOUNTER — PATIENT MESSAGE (OUTPATIENT)
Dept: OBSTETRICS AND GYNECOLOGY | Facility: CLINIC | Age: 25
End: 2020-05-07

## 2020-06-19 DIAGNOSIS — F41.9 ANXIETY: Primary | ICD-10-CM

## 2020-06-19 RX ORDER — HYDROXYZINE HYDROCHLORIDE 50 MG/1
50 TABLET, FILM COATED ORAL 4 TIMES DAILY
Qty: 30 TABLET | Refills: 4 | Status: SHIPPED | OUTPATIENT
Start: 2020-06-19 | End: 2021-01-05 | Stop reason: SDUPTHER

## 2020-06-26 DIAGNOSIS — F41.9 ANXIETY: ICD-10-CM

## 2020-06-26 RX ORDER — HYDROXYZINE HYDROCHLORIDE 50 MG/1
50 TABLET, FILM COATED ORAL 4 TIMES DAILY
Qty: 30 TABLET | Refills: 4 | Status: CANCELLED | OUTPATIENT
Start: 2020-06-26

## 2020-09-29 ENCOUNTER — PATIENT MESSAGE (OUTPATIENT)
Dept: OBSTETRICS AND GYNECOLOGY | Facility: CLINIC | Age: 25
End: 2020-09-29

## 2020-09-29 RX ORDER — FLUCONAZOLE 150 MG/1
TABLET ORAL
Qty: 2 TABLET | Refills: 0 | Status: SHIPPED | OUTPATIENT
Start: 2020-09-29 | End: 2021-06-21

## 2020-12-31 ENCOUNTER — TELEPHONE (OUTPATIENT)
Dept: OBSTETRICS AND GYNECOLOGY | Facility: CLINIC | Age: 25
End: 2020-12-31

## 2021-01-05 ENCOUNTER — OFFICE VISIT (OUTPATIENT)
Dept: OBSTETRICS AND GYNECOLOGY | Facility: CLINIC | Age: 26
End: 2021-01-05
Payer: COMMERCIAL

## 2021-01-05 VITALS
HEART RATE: 80 BPM | WEIGHT: 137 LBS | BODY MASS INDEX: 25.21 KG/M2 | SYSTOLIC BLOOD PRESSURE: 114 MMHG | DIASTOLIC BLOOD PRESSURE: 76 MMHG | HEIGHT: 62 IN

## 2021-01-05 DIAGNOSIS — F41.9 ANXIETY: ICD-10-CM

## 2021-01-05 DIAGNOSIS — Z11.3 SCREENING FOR VENEREAL DISEASE (VD): Primary | ICD-10-CM

## 2021-01-05 DIAGNOSIS — Z30.41 ENCOUNTER FOR SURVEILLANCE OF CONTRACEPTIVE PILLS: ICD-10-CM

## 2021-01-05 PROCEDURE — 99213 PR OFFICE/OUTPT VISIT, EST, LEVL III, 20-29 MIN: ICD-10-PCS | Mod: S$GLB,,, | Performed by: OBSTETRICS & GYNECOLOGY

## 2021-01-05 PROCEDURE — 99213 OFFICE O/P EST LOW 20 MIN: CPT | Mod: S$GLB,,, | Performed by: OBSTETRICS & GYNECOLOGY

## 2021-01-05 PROCEDURE — 3008F PR BODY MASS INDEX (BMI) DOCUMENTED: ICD-10-PCS | Mod: CPTII,S$GLB,, | Performed by: OBSTETRICS & GYNECOLOGY

## 2021-01-05 PROCEDURE — 3008F BODY MASS INDEX DOCD: CPT | Mod: CPTII,S$GLB,, | Performed by: OBSTETRICS & GYNECOLOGY

## 2021-01-05 RX ORDER — NORETHINDRONE ACETATE AND ETHINYL ESTRADIOL .02; 1 MG/1; MG/1
1 TABLET ORAL DAILY
Qty: 21 TABLET | Refills: 11 | Status: SHIPPED | OUTPATIENT
Start: 2021-01-05 | End: 2021-10-21 | Stop reason: SDUPTHER

## 2021-01-05 RX ORDER — HYDROXYZINE HYDROCHLORIDE 50 MG/1
50 TABLET, FILM COATED ORAL 4 TIMES DAILY PRN
Qty: 30 TABLET | Refills: 11 | Status: SHIPPED | OUTPATIENT
Start: 2021-01-05 | End: 2022-06-24 | Stop reason: SDUPTHER

## 2021-02-09 ENCOUNTER — PATIENT MESSAGE (OUTPATIENT)
Dept: OBSTETRICS AND GYNECOLOGY | Facility: CLINIC | Age: 26
End: 2021-02-09

## 2021-02-22 RX ORDER — BUPROPION HYDROCHLORIDE 150 MG/1
150 TABLET, EXTENDED RELEASE ORAL DAILY
Qty: 30 TABLET | Refills: 2 | Status: SHIPPED | OUTPATIENT
Start: 2021-02-22 | End: 2022-05-19

## 2021-03-08 ENCOUNTER — OFFICE VISIT (OUTPATIENT)
Dept: OBSTETRICS AND GYNECOLOGY | Facility: CLINIC | Age: 26
End: 2021-03-08
Payer: COMMERCIAL

## 2021-03-08 PROCEDURE — 99213 PR OFFICE/OUTPT VISIT, EST, LEVL III, 20-29 MIN: ICD-10-PCS | Mod: 95,,, | Performed by: OBSTETRICS & GYNECOLOGY

## 2021-03-08 PROCEDURE — 99213 OFFICE O/P EST LOW 20 MIN: CPT | Mod: 95,,, | Performed by: OBSTETRICS & GYNECOLOGY

## 2021-04-29 ENCOUNTER — TELEPHONE (OUTPATIENT)
Dept: OBSTETRICS AND GYNECOLOGY | Facility: CLINIC | Age: 26
End: 2021-04-29

## 2021-05-05 ENCOUNTER — TELEPHONE (OUTPATIENT)
Dept: OBSTETRICS AND GYNECOLOGY | Facility: CLINIC | Age: 26
End: 2021-05-05

## 2021-05-05 RX ORDER — VALACYCLOVIR HYDROCHLORIDE 1 G/1
1000 TABLET, FILM COATED ORAL DAILY
Qty: 30 TABLET | Refills: 11 | Status: CANCELLED | OUTPATIENT
Start: 2021-05-05 | End: 2021-05-06

## 2021-05-28 ENCOUNTER — OFFICE VISIT (OUTPATIENT)
Dept: OBSTETRICS AND GYNECOLOGY | Facility: CLINIC | Age: 26
End: 2021-05-28
Payer: COMMERCIAL

## 2021-05-28 VITALS
HEART RATE: 74 BPM | WEIGHT: 136 LBS | SYSTOLIC BLOOD PRESSURE: 106 MMHG | BODY MASS INDEX: 25.03 KG/M2 | HEIGHT: 62 IN | DIASTOLIC BLOOD PRESSURE: 69 MMHG

## 2021-05-28 DIAGNOSIS — K58.9 IRRITABLE BOWEL SYNDROME, UNSPECIFIED TYPE: ICD-10-CM

## 2021-05-28 DIAGNOSIS — N80.9 ENDOMETRIOSIS: Primary | ICD-10-CM

## 2021-05-28 PROCEDURE — 3008F BODY MASS INDEX DOCD: CPT | Mod: CPTII,S$GLB,, | Performed by: OBSTETRICS & GYNECOLOGY

## 2021-05-28 PROCEDURE — 99214 PR OFFICE/OUTPT VISIT, EST, LEVL IV, 30-39 MIN: ICD-10-PCS | Mod: S$GLB,,, | Performed by: OBSTETRICS & GYNECOLOGY

## 2021-05-28 PROCEDURE — 99214 OFFICE O/P EST MOD 30 MIN: CPT | Mod: S$GLB,,, | Performed by: OBSTETRICS & GYNECOLOGY

## 2021-05-28 PROCEDURE — 3008F PR BODY MASS INDEX (BMI) DOCUMENTED: ICD-10-PCS | Mod: CPTII,S$GLB,, | Performed by: OBSTETRICS & GYNECOLOGY

## 2021-05-28 RX ORDER — CLINDAMYCIN HYDROCHLORIDE 300 MG/1
CAPSULE ORAL
COMMUNITY
Start: 2021-05-01 | End: 2022-05-19

## 2021-05-28 RX ORDER — MUPIROCIN 20 MG/G
OINTMENT TOPICAL
COMMUNITY
Start: 2021-05-01 | End: 2022-05-19

## 2021-05-28 RX ORDER — VALACYCLOVIR HYDROCHLORIDE 1 G/1
TABLET, FILM COATED ORAL
COMMUNITY
Start: 2021-05-08

## 2021-05-28 RX ORDER — ACYCLOVIR 50 MG/G
CREAM TOPICAL
COMMUNITY
Start: 2021-05-06

## 2021-06-21 ENCOUNTER — TELEPHONE (OUTPATIENT)
Dept: OBSTETRICS AND GYNECOLOGY | Facility: CLINIC | Age: 26
End: 2021-06-21

## 2021-06-21 DIAGNOSIS — B37.9 YEAST INFECTION: Primary | ICD-10-CM

## 2021-06-21 RX ORDER — FLUCONAZOLE 150 MG/1
150 TABLET ORAL DAILY
Qty: 1 TABLET | Refills: 0 | Status: SHIPPED | OUTPATIENT
Start: 2021-06-21 | End: 2021-06-22

## 2021-06-24 ENCOUNTER — PATIENT MESSAGE (OUTPATIENT)
Dept: OBSTETRICS AND GYNECOLOGY | Facility: CLINIC | Age: 26
End: 2021-06-24

## 2021-06-24 DIAGNOSIS — A59.9 TRICHOMONIASIS: Primary | ICD-10-CM

## 2021-06-24 RX ORDER — METRONIDAZOLE 500 MG/1
2000 TABLET ORAL ONCE
Qty: 4 TABLET | Refills: 0 | Status: SHIPPED | OUTPATIENT
Start: 2021-06-24 | End: 2021-06-24

## 2021-06-29 ENCOUNTER — OFFICE VISIT (OUTPATIENT)
Dept: OBSTETRICS AND GYNECOLOGY | Facility: CLINIC | Age: 26
End: 2021-06-29
Payer: COMMERCIAL

## 2021-06-29 VITALS
BODY MASS INDEX: 24.69 KG/M2 | WEIGHT: 135 LBS | SYSTOLIC BLOOD PRESSURE: 103 MMHG | HEART RATE: 89 BPM | DIASTOLIC BLOOD PRESSURE: 72 MMHG

## 2021-06-29 DIAGNOSIS — Z01.411 ENCOUNTER FOR GYNECOLOGICAL EXAMINATION (GENERAL) (ROUTINE) WITH ABNORMAL FINDINGS: Primary | ICD-10-CM

## 2021-06-29 DIAGNOSIS — Z20.2 EXPOSURE TO SEXUALLY TRANSMITTED DISEASE (STD): ICD-10-CM

## 2021-06-29 PROCEDURE — 99395 PREV VISIT EST AGE 18-39: CPT | Mod: S$GLB,,, | Performed by: OBSTETRICS & GYNECOLOGY

## 2021-06-29 PROCEDURE — 3008F BODY MASS INDEX DOCD: CPT | Mod: CPTII,S$GLB,, | Performed by: OBSTETRICS & GYNECOLOGY

## 2021-06-29 PROCEDURE — 3008F PR BODY MASS INDEX (BMI) DOCUMENTED: ICD-10-PCS | Mod: CPTII,S$GLB,, | Performed by: OBSTETRICS & GYNECOLOGY

## 2021-06-29 PROCEDURE — 99395 PR PREVENTIVE VISIT,EST,18-39: ICD-10-PCS | Mod: S$GLB,,, | Performed by: OBSTETRICS & GYNECOLOGY

## 2021-07-08 ENCOUNTER — TELEPHONE (OUTPATIENT)
Dept: OBSTETRICS AND GYNECOLOGY | Facility: CLINIC | Age: 26
End: 2021-07-08

## 2021-07-23 ENCOUNTER — PATIENT MESSAGE (OUTPATIENT)
Dept: OBSTETRICS AND GYNECOLOGY | Facility: CLINIC | Age: 26
End: 2021-07-23

## 2021-07-26 ENCOUNTER — TELEPHONE (OUTPATIENT)
Dept: OBSTETRICS AND GYNECOLOGY | Facility: CLINIC | Age: 26
End: 2021-07-26

## 2021-10-21 DIAGNOSIS — Z30.41 ENCOUNTER FOR SURVEILLANCE OF CONTRACEPTIVE PILLS: ICD-10-CM

## 2021-10-22 RX ORDER — NORETHINDRONE ACETATE AND ETHINYL ESTRADIOL .02; 1 MG/1; MG/1
1 TABLET ORAL DAILY
Qty: 21 TABLET | Refills: 8 | Status: SHIPPED | OUTPATIENT
Start: 2021-10-22 | End: 2022-03-17

## 2022-01-25 DIAGNOSIS — Z34.81 ENCOUNTER FOR SUPERVISION OF OTHER NORMAL PREGNANCY IN FIRST TRIMESTER: Primary | ICD-10-CM

## 2022-01-26 ENCOUNTER — PROCEDURE VISIT (OUTPATIENT)
Dept: OBSTETRICS AND GYNECOLOGY | Facility: CLINIC | Age: 27
End: 2022-01-26
Payer: MEDICAID

## 2022-01-26 DIAGNOSIS — Z34.81 ENCOUNTER FOR SUPERVISION OF OTHER NORMAL PREGNANCY IN FIRST TRIMESTER: ICD-10-CM

## 2022-01-26 PROCEDURE — 76817 TRANSVAGINAL US OBSTETRIC: CPT | Mod: S$GLB,,, | Performed by: OBSTETRICS & GYNECOLOGY

## 2022-01-26 PROCEDURE — 76817 US OB/GYN PROCEDURE (VIEWPOINT): ICD-10-PCS | Mod: S$GLB,,, | Performed by: OBSTETRICS & GYNECOLOGY

## 2022-02-10 DIAGNOSIS — Z34.81 ENCOUNTER FOR SUPERVISION OF OTHER NORMAL PREGNANCY IN FIRST TRIMESTER: Primary | ICD-10-CM

## 2022-02-15 ENCOUNTER — TELEPHONE (OUTPATIENT)
Dept: OBSTETRICS AND GYNECOLOGY | Facility: CLINIC | Age: 27
End: 2022-02-15
Payer: COMMERCIAL

## 2022-02-16 ENCOUNTER — PROCEDURE VISIT (OUTPATIENT)
Dept: OBSTETRICS AND GYNECOLOGY | Facility: CLINIC | Age: 27
End: 2022-02-16
Payer: COMMERCIAL

## 2022-02-16 DIAGNOSIS — Z34.81 ENCOUNTER FOR SUPERVISION OF OTHER NORMAL PREGNANCY IN FIRST TRIMESTER: ICD-10-CM

## 2022-02-16 LAB
AMORPH URATE CRY URNS QL MICRO: NEGATIVE
BACTERIA #/AREA URNS HPF: ABNORMAL /[HPF]
BILIRUB UR QL STRIP: NEGATIVE
CLARITY UR: CLEAR
COLOR UR: YELLOW
EPITHELIAL CELLS: ABNORMAL
GLUCOSE (UA): NEGATIVE MG/DL
KETONES UR QL STRIP: NEGATIVE MG/DL
LEUKOCYTE ESTERASE UR QL STRIP: ABNORMAL
MUCOUS THREADS URNS QL MICRO: NEGATIVE
NITRITE UR QL STRIP: NEGATIVE
OCCULT BLOOD: NEGATIVE
PH, URINE: 8 (ref 5–7.5)
PROT UR QL STRIP: NEGATIVE MG/DL
RBC/HPF: NEGATIVE
SP GR UR STRIP: 1.01 (ref 1–1.03)
UROBILINOGEN, URINE: NORMAL E.U./DL (ref 0–1)
WBC/HPF: ABNORMAL

## 2022-02-16 PROCEDURE — 76801 OB US < 14 WKS SINGLE FETUS: CPT | Mod: S$GLB,,, | Performed by: OBSTETRICS & GYNECOLOGY

## 2022-02-16 PROCEDURE — 76801 US OB/GYN PROCEDURE (VIEWPOINT): ICD-10-PCS | Mod: S$GLB,,, | Performed by: OBSTETRICS & GYNECOLOGY

## 2022-02-24 ENCOUNTER — TELEPHONE (OUTPATIENT)
Dept: OBSTETRICS AND GYNECOLOGY | Facility: CLINIC | Age: 27
End: 2022-02-24
Payer: COMMERCIAL

## 2022-03-11 ENCOUNTER — PATIENT MESSAGE (OUTPATIENT)
Dept: OBSTETRICS AND GYNECOLOGY | Facility: CLINIC | Age: 27
End: 2022-03-11
Payer: COMMERCIAL

## 2022-03-17 ENCOUNTER — ROUTINE PRENATAL (OUTPATIENT)
Dept: OBSTETRICS AND GYNECOLOGY | Facility: CLINIC | Age: 27
End: 2022-03-17
Payer: COMMERCIAL

## 2022-03-17 VITALS
WEIGHT: 118 LBS | HEART RATE: 78 BPM | SYSTOLIC BLOOD PRESSURE: 113 MMHG | BODY MASS INDEX: 21.58 KG/M2 | DIASTOLIC BLOOD PRESSURE: 72 MMHG

## 2022-03-17 DIAGNOSIS — Z3A.12 12 WEEKS GESTATION OF PREGNANCY: ICD-10-CM

## 2022-03-17 DIAGNOSIS — Z11.3 SCREEN FOR SEXUALLY TRANSMITTED DISEASES: ICD-10-CM

## 2022-03-17 DIAGNOSIS — O21.9 NAUSEA AND VOMITING DURING PREGNANCY PRIOR TO 22 WEEKS GESTATION: ICD-10-CM

## 2022-03-17 DIAGNOSIS — Z34.91 FIRST TRIMESTER PREGNANCY: ICD-10-CM

## 2022-03-17 DIAGNOSIS — Z34.81 ENCOUNTER FOR SUPERVISION OF OTHER NORMAL PREGNANCY IN FIRST TRIMESTER: Primary | ICD-10-CM

## 2022-03-17 PROCEDURE — 0500F PR INITIAL PRENATAL CARE VISIT: ICD-10-PCS | Mod: S$GLB,,, | Performed by: STUDENT IN AN ORGANIZED HEALTH CARE EDUCATION/TRAINING PROGRAM

## 2022-03-17 PROCEDURE — 0500F INITIAL PRENATAL CARE VISIT: CPT | Mod: S$GLB,,, | Performed by: STUDENT IN AN ORGANIZED HEALTH CARE EDUCATION/TRAINING PROGRAM

## 2022-03-17 RX ORDER — PROMETHAZINE HYDROCHLORIDE 12.5 MG/1
12.5 TABLET ORAL EVERY 8 HOURS PRN
Qty: 30 TABLET | Refills: 1 | Status: SHIPPED | OUTPATIENT
Start: 2022-03-17

## 2022-03-17 RX ORDER — ONDANSETRON 4 MG/1
4 TABLET, FILM COATED ORAL EVERY 6 HOURS
Qty: 120 TABLET | Refills: 2 | Status: SHIPPED | OUTPATIENT
Start: 2022-03-17 | End: 2022-06-15

## 2022-03-17 NOTE — PROGRESS NOTES
CC: Initial OB visit    HPI:  26 y.o. at 12w5d with EDC of 2022, by 8w Ultrasound  Complains of nausea vomiting.  She reports 5-6 episodes a day.  She has also had a noted 17 lb weight loss.  She reports going to the ED given in her knee and did help outside.  She is currently on any medications other than her pending night.  He has no other complaints today.    ROS:  Negative unless mentioned above    PMH: IBS, endometriosis, ADHD, HSV  PSH: tonsillectomy, eye surgery, oophorectomy, dx lap for endometriosis  OB Hx: : SAB   G2: 2018, , 6lb4oz   G3: , , 5lb9oz   G4: current  SOC: + THC use, denies S/E  FH: denies family history of congenital defects, mental retardation, twins, cystic fibrosis, Down's syndrome, sickle cell, NTD's, cleft lip/palate, cardiac defects, abdominal wall defects, GYN cancers   GYN Hx: past history: HS, denies other STD's,  Positive History of HSV,  Negative History of Abnormal PAP  Review of patient's allergies indicates:   Allergen Reactions    Amoxicillin trihydrate     Potassium clavulanate     Sulfa (sulfonamide antibiotics)      Current Outpatient Medications   Medication Instructions    acyclovir 5% (ZOVIRAX) 5 % Crea No dose, route, or frequency recorded.    buPROPion (WELLBUTRIN SR) 150 mg, Oral, Daily    clindamycin (CLEOCIN) 300 MG capsule No dose, route, or frequency recorded.    hydrOXYzine (ATARAX) 50 mg, Oral, 4 times daily PRN    mupirocin (BACTROBAN) 2 % ointment No dose, route, or frequency recorded.    valACYclovir (VALTREX) 1000 MG tablet No dose, route, or frequency recorded.     PHYSICAL EXAM  Prenatal Vitals  BP: 113/72  Weight: 53.5 kg (118 lb)    Body mass index is 21.58 kg/m².    Wt Readings from Last 3 Encounters:   22 53.5 kg (118 lb)   21 61.2 kg (135 lb)   21 61.7 kg (136 lb)     General: NAD, well developed, well nourished  Psych: alert and oriented to person, time and place, normal affect  HEENT:  normocephalic, atraumatic  Gravid, soft, NT  Skin: warm, dry  Neuro: normal gait, gross motor function intact  Pelvic: NEFG. Normal vaginal mucosa, pink/ruggated, no lesions or discharge. Cvx closed, nonfriable  No cyanosis, clubbing or edema    ASSESSMENT: Patient is a 26 y.o.  at 12w5d with  Patient Active Problem List   Diagnosis    Endometriosis of uterus    Postpartum depression    Irritable bowel syndrome    Endometriosis    Nausea and vomiting during pregnancy prior to 22 weeks gestation     PLAN:  Pt with n/v, will give zofran and phenergan today  Pt to notify clinic if her n/v worsens or does not improve by Monday, possible need for admission  PNL, GC/CZ, PAP - utd  PNV, Iron  Pain, Fever, Bleeding Precautions  ALVAREZ, DORINDA, PreE precautions  Encouraged Breast feeding  F/U in 1 weeks

## 2022-03-23 ENCOUNTER — TELEPHONE (OUTPATIENT)
Dept: OBSTETRICS AND GYNECOLOGY | Facility: CLINIC | Age: 27
End: 2022-03-23
Payer: COMMERCIAL

## 2022-03-24 ENCOUNTER — ROUTINE PRENATAL (OUTPATIENT)
Dept: OBSTETRICS AND GYNECOLOGY | Facility: CLINIC | Age: 27
End: 2022-03-24
Payer: COMMERCIAL

## 2022-03-24 VITALS
SYSTOLIC BLOOD PRESSURE: 103 MMHG | WEIGHT: 119 LBS | DIASTOLIC BLOOD PRESSURE: 71 MMHG | HEART RATE: 65 BPM | BODY MASS INDEX: 21.77 KG/M2

## 2022-03-24 DIAGNOSIS — Z3A.13 13 WEEKS GESTATION OF PREGNANCY: ICD-10-CM

## 2022-03-24 DIAGNOSIS — Z34.82 ENCOUNTER FOR SUPERVISION OF OTHER NORMAL PREGNANCY IN SECOND TRIMESTER: Primary | ICD-10-CM

## 2022-03-24 PROCEDURE — 0502F PR SUBSEQUENT PRENATAL CARE: ICD-10-PCS | Mod: S$GLB,,, | Performed by: STUDENT IN AN ORGANIZED HEALTH CARE EDUCATION/TRAINING PROGRAM

## 2022-03-24 PROCEDURE — 0502F SUBSEQUENT PRENATAL CARE: CPT | Mod: S$GLB,,, | Performed by: STUDENT IN AN ORGANIZED HEALTH CARE EDUCATION/TRAINING PROGRAM

## 2022-03-24 NOTE — PROGRESS NOTES
CC: Follow-Up OB    HPI:   26 y.o.  at 13w5d with EDC of 2022, by 8w Ultrasound, here for her follow up OB visit. Complains of mild vomiting. Pt was seen last week with complaints today multiple episodes of emesis and 17 lb weight loss. She was started on zofran and phenergan, reports great improvement, only has occasional episode of emesis and has gained weight since her last visit.     Pregnancy ROS:  Negative leakage of fluid   Negative vaginal bleeding   Negative headache, vision changes, RUQ pain, epigastric pain  Negative contractions/abdominal pain   Negative pelvic pressure     Physical Exam:  Prenatal Vitals  BP: 103/71  Weight: 54 kg (119 lb)  Urine Glucose: Negative  Urine Albumin: Negative    Wt Readings from Last 3 Encounters:   22 54 kg (119 lb)   22 53.5 kg (118 lb)   21 61.2 kg (135 lb)     Body mass index is 21.77 kg/m².    General: NAD, well developed, well nourished  Psych: alert and oriented to person, time and place, normal affect  HEENT: normocephalic, atraumatic  Abd: Gravid, soft, NT, ND  Skin: warm, dry  Neuro: normal gait, gross motor function intact  No cyanosis, clubbing or edema    Maternal Blood Type: O+/-    ASSESSMENT: 26 y.o.  @ 13w5d with   Patient Active Problem List   Diagnosis    Endometriosis of uterus    Postpartum depression    Irritable bowel syndrome    Endometriosis    Nausea and vomiting during pregnancy prior to 22 weeks gestation     PLAN:  Pt may continue zofran and phenergan PRN  NIPT and MSAFP on RTC  Pain, fever, bleeding precautions  ALVAREZ, DORINDA, PreE precautions  Cont PNV, Iron  Return to clinic in 4 weeks

## 2022-04-05 ENCOUNTER — PATIENT MESSAGE (OUTPATIENT)
Dept: OBSTETRICS AND GYNECOLOGY | Facility: CLINIC | Age: 27
End: 2022-04-05
Payer: COMMERCIAL

## 2022-04-21 ENCOUNTER — ROUTINE PRENATAL (OUTPATIENT)
Dept: OBSTETRICS AND GYNECOLOGY | Facility: CLINIC | Age: 27
End: 2022-04-21
Payer: COMMERCIAL

## 2022-04-21 VITALS
WEIGHT: 121 LBS | BODY MASS INDEX: 22.13 KG/M2 | DIASTOLIC BLOOD PRESSURE: 69 MMHG | HEART RATE: 70 BPM | SYSTOLIC BLOOD PRESSURE: 106 MMHG

## 2022-04-21 DIAGNOSIS — Z3A.17 17 WEEKS GESTATION OF PREGNANCY: ICD-10-CM

## 2022-04-21 DIAGNOSIS — O09.92 SUPERVISION OF HIGH RISK PREGNANCY IN SECOND TRIMESTER: Primary | ICD-10-CM

## 2022-04-21 PROCEDURE — 0502F SUBSEQUENT PRENATAL CARE: CPT | Mod: S$GLB,,, | Performed by: STUDENT IN AN ORGANIZED HEALTH CARE EDUCATION/TRAINING PROGRAM

## 2022-04-21 PROCEDURE — 0502F PR SUBSEQUENT PRENATAL CARE: ICD-10-PCS | Mod: S$GLB,,, | Performed by: STUDENT IN AN ORGANIZED HEALTH CARE EDUCATION/TRAINING PROGRAM

## 2022-04-21 NOTE — PROGRESS NOTES
CC: Follow-Up OB    HPI:   26 y.o.  at 17w5d with EDC of 2022, by 8w Ultrasound, here for her follow up OB visit. Complains of nothing today.    Pregnancy ROS:  Negative leakage of fluid   Negative vaginal bleeding   Negative headache, vision changes, RUQ pain, epigastric pain  Negative contractions/abdominal pain   Negative pelvic pressure     Physical Exam:  Prenatal Vitals  BP: 106/69  Weight: 54.9 kg (121 lb)  Urine Glucose: Negative  Urine Albumin: Negative    Wt Readings from Last 3 Encounters:   22 54.9 kg (121 lb)   22 54 kg (119 lb)   22 53.5 kg (118 lb)     Body mass index is 22.13 kg/m².    General: NAD, well developed, well nourished  Psych: alert and oriented to person, time and place, normal affect  HEENT: normocephalic, atraumatic  Abd: Gravid, soft, NT, ND  Skin: warm, dry  Neuro: normal gait, gross motor function intact  No cyanosis, clubbing or edema    FHTs: 140's    Maternal Blood Type: O+/-    ASSESSMENT: 26 y.o.  @ 17w5d with   Patient Active Problem List   Diagnosis    Endometriosis of uterus    Postpartum depression    Irritable bowel syndrome    Endometriosis    Nausea and vomiting during pregnancy prior to 22 weeks gestation     PLAN:  NIPT and MSAFP today  Pt also to get remainder of her PNL  Anatomy scan at 22 weeks  Pain, fever, bleeding precautions  ALVAREZ, DORINDA, PreE precautions  Cont PNV, Iron  Return to clinic in 4 weeks

## 2022-04-27 LAB
AFP MOM: 0.83
AFP, SERUM: 42.5 NG/ML
CALC'D GESTATIONAL AGE: 18 WEEKS
COMMENT: NORMAL
DONOR AGE: EGG RETRIEVAL: NORMAL
DONOR EGG: NO
EDD DETERMINED BY: NORMAL
EST'D DATE OF DELIVERY: NORMAL
HX OF NEURAL TUBE DEFECTS: NO
INSULIN DEPEND DIABETIC: NO
INTERPRETATION: NORMAL
Lab: NORMAL
MATERNAL WEIGHT: 121 LBS
MOTHER'S ETHNIC ORIGIN: NORMAL
NUMBER OF FETUSES: 1
PREV PREGNANCY DOWN SYND: NO
REPEAT SPECIMEN: NO
RISK FOR ONTD: NORMAL

## 2022-05-19 ENCOUNTER — ROUTINE PRENATAL (OUTPATIENT)
Dept: OBSTETRICS AND GYNECOLOGY | Facility: CLINIC | Age: 27
End: 2022-05-19
Payer: COMMERCIAL

## 2022-05-19 VITALS
BODY MASS INDEX: 22.31 KG/M2 | WEIGHT: 122 LBS | DIASTOLIC BLOOD PRESSURE: 66 MMHG | SYSTOLIC BLOOD PRESSURE: 102 MMHG | HEART RATE: 85 BPM

## 2022-05-19 DIAGNOSIS — O09.92 SUPERVISION OF HIGH RISK PREGNANCY IN SECOND TRIMESTER: Primary | ICD-10-CM

## 2022-05-19 DIAGNOSIS — Z34.82 ENCOUNTER FOR SUPERVISION OF OTHER NORMAL PREGNANCY IN SECOND TRIMESTER: ICD-10-CM

## 2022-05-19 PROCEDURE — 0502F PR SUBSEQUENT PRENATAL CARE: ICD-10-PCS | Mod: CPTII,S$GLB,, | Performed by: OBSTETRICS & GYNECOLOGY

## 2022-05-19 PROCEDURE — 0502F SUBSEQUENT PRENATAL CARE: CPT | Mod: CPTII,S$GLB,, | Performed by: OBSTETRICS & GYNECOLOGY

## 2022-05-19 PROCEDURE — 76805 OB US >/= 14 WKS SNGL FETUS: CPT | Mod: S$GLB,,, | Performed by: OBSTETRICS & GYNECOLOGY

## 2022-05-19 PROCEDURE — 76805 US OB/GYN PROCEDURE (VIEWPOINT): ICD-10-PCS | Mod: S$GLB,,, | Performed by: OBSTETRICS & GYNECOLOGY

## 2022-05-19 NOTE — PROGRESS NOTES
Subjective:       Patient ID: Dory Armas is a 26 y.o.  at 21w5d     Chief Complaint:  Routine Prenatal Visit      History of Present Illness  No complaints. Labs and history reviewed with pt.         Review of Systems  Denies n/v, f/c, dysuria, contractions,   VD, VB, round ligament pain, headaches       Objective:     Vitals:    22 1541   BP: 102/66   Pulse: 85     Wt Readings from Last 3 Encounters:   22 55.3 kg (122 lb)   22 54.9 kg (121 lb)   22 54 kg (119 lb)     nad  NCAT  pupils normal size  Skin nml no rashes or lesions  No resp distress, resp even and unlabored   nt, nd,  no rebound no guarding  No cyanosis or clubbing, edema appropriate for pregn  FHT: 150s   Assessment:        1. Encounter for supervision of other normal pregnancy in second trimester                Plan:      Anatomy normal    Encouraged PNV  Pain, fever, bleeding precautions   RTC 4 weeks

## 2022-06-16 ENCOUNTER — ROUTINE PRENATAL (OUTPATIENT)
Dept: OBSTETRICS AND GYNECOLOGY | Facility: CLINIC | Age: 27
End: 2022-06-16
Payer: MEDICAID

## 2022-06-16 VITALS
BODY MASS INDEX: 22.13 KG/M2 | WEIGHT: 121 LBS | DIASTOLIC BLOOD PRESSURE: 61 MMHG | HEART RATE: 89 BPM | SYSTOLIC BLOOD PRESSURE: 98 MMHG

## 2022-06-16 DIAGNOSIS — O09.92 SUPERVISION OF HIGH RISK PREGNANCY IN SECOND TRIMESTER: Primary | ICD-10-CM

## 2022-06-16 PROCEDURE — 99213 PR OFFICE/OUTPT VISIT, EST, LEVL III, 20-29 MIN: ICD-10-PCS | Mod: TH,S$GLB,, | Performed by: OBSTETRICS & GYNECOLOGY

## 2022-06-16 PROCEDURE — 99213 OFFICE O/P EST LOW 20 MIN: CPT | Mod: TH,S$GLB,, | Performed by: OBSTETRICS & GYNECOLOGY

## 2022-06-16 NOTE — PROGRESS NOTES
Subjective:       Patient ID: Dory Armas is a 27 y.o.  at 25w5d     Chief Complaint:  Routine Prenatal Visit      History of Present Illness  No complaints. Labs and history reviewed with pt.         Review of Systems  Denies n/v, f/c, dysuria, contractions,   VD, VB, round ligament pain, headaches       Objective:     Vitals:    22 1111   BP: 98/61   Pulse: 89     Wt Readings from Last 3 Encounters:   22 54.9 kg (121 lb)   22 55.3 kg (122 lb)   22 54.9 kg (121 lb)     nad  NCAT  pupils normal size  Skin nml no rashes or lesions  No resp distress, resp even and unlabored   nt, nd,  no rebound no guarding  No cyanosis or clubbing, edema appropriate for pregn  FHT: 150s   Assessment:        1. Supervision of high risk pregnancy in second trimester                Plan:      Discussed stopping medical THC due to risks of still birth   Pt declined other anxiety tx at this time   Encouraged PNV  Pain, fever, bleeding precautions   RTC 4 weeks

## 2022-06-23 ENCOUNTER — PATIENT MESSAGE (OUTPATIENT)
Dept: OBSTETRICS AND GYNECOLOGY | Facility: CLINIC | Age: 27
End: 2022-06-23
Payer: MEDICAID

## 2022-06-24 DIAGNOSIS — F41.9 ANXIETY: ICD-10-CM

## 2022-06-24 RX ORDER — SERTRALINE HYDROCHLORIDE 50 MG/1
50 TABLET, FILM COATED ORAL DAILY
Qty: 30 TABLET | Refills: 2 | Status: SHIPPED | OUTPATIENT
Start: 2022-06-24 | End: 2022-06-28 | Stop reason: SDUPTHER

## 2022-06-24 RX ORDER — HYDROXYZINE HYDROCHLORIDE 50 MG/1
25 TABLET, FILM COATED ORAL 4 TIMES DAILY PRN
Qty: 30 TABLET | Refills: 11 | Status: SHIPPED | OUTPATIENT
Start: 2022-06-24 | End: 2023-07-10

## 2022-06-28 RX ORDER — SERTRALINE HYDROCHLORIDE 50 MG/1
50 TABLET, FILM COATED ORAL DAILY
Qty: 30 TABLET | Refills: 2 | Status: SHIPPED | OUTPATIENT
Start: 2022-06-28 | End: 2022-07-14

## 2022-06-29 ENCOUNTER — PATIENT MESSAGE (OUTPATIENT)
Dept: OBSTETRICS AND GYNECOLOGY | Facility: CLINIC | Age: 27
End: 2022-06-29
Payer: MEDICAID

## 2022-06-29 DIAGNOSIS — F41.9 ANXIETY DURING PREGNANCY: Primary | ICD-10-CM

## 2022-06-29 DIAGNOSIS — O99.340 ANXIETY DURING PREGNANCY: Primary | ICD-10-CM

## 2022-06-29 RX ORDER — BUSPIRONE HYDROCHLORIDE 5 MG/1
5 TABLET ORAL 2 TIMES DAILY
Qty: 60 TABLET | Refills: 11 | Status: SHIPPED | OUTPATIENT
Start: 2022-06-29 | End: 2023-06-29

## 2022-06-29 NOTE — TELEPHONE ENCOUNTER
----- Message from Kenya Valdez sent at 6/28/2022  5:37 PM CDT -----    ----- Message -----  From: Coni Roman  Sent: 6/28/2022   4:57 PM CDT  To: Edson Deleon (Obgyn) Staff    Dorycass Armas is calling to get an update on her day time anti anxiety medication. She said her pharmacy still doesn't have anything and that she doesn't want Zoloft. Please give her a fabio back with an update on her request [phone] (home)       [id number] [address] [address]  Phone: [phone] Fax: [phone]    She said she called yesterday and this morning about this.

## 2022-07-13 ENCOUNTER — TELEPHONE (OUTPATIENT)
Dept: OBSTETRICS AND GYNECOLOGY | Facility: CLINIC | Age: 27
End: 2022-07-13
Payer: MEDICAID

## 2022-07-14 ENCOUNTER — ROUTINE PRENATAL (OUTPATIENT)
Dept: OBSTETRICS AND GYNECOLOGY | Facility: CLINIC | Age: 27
End: 2022-07-14
Payer: MEDICAID

## 2022-07-14 VITALS
HEART RATE: 100 BPM | BODY MASS INDEX: 22.5 KG/M2 | SYSTOLIC BLOOD PRESSURE: 97 MMHG | DIASTOLIC BLOOD PRESSURE: 64 MMHG | WEIGHT: 123 LBS

## 2022-07-14 DIAGNOSIS — Z34.83 ENCOUNTER FOR SUPERVISION OF OTHER NORMAL PREGNANCY IN THIRD TRIMESTER: Primary | ICD-10-CM

## 2022-07-14 PROCEDURE — 99213 PR OFFICE/OUTPT VISIT, EST, LEVL III, 20-29 MIN: ICD-10-PCS | Mod: TH,S$GLB,, | Performed by: OBSTETRICS & GYNECOLOGY

## 2022-07-14 PROCEDURE — 99213 OFFICE O/P EST LOW 20 MIN: CPT | Mod: TH,S$GLB,, | Performed by: OBSTETRICS & GYNECOLOGY

## 2022-07-16 LAB — GLUCOSE 1 HR POST 50 GM: 110 MG/DL

## 2022-07-16 NOTE — PROGRESS NOTES
Subjective:       Patient ID: Dory Armas is a 27 y.o.  at 29w6d     Chief Complaint:  Routine Prenatal Visit      History of Present Illness  C/o dizzy and shaky in the am. Has not passed out but feels as though she might. Eating makes it worse.. Labs and history reviewed with pt.         Review of Systems  Denies n/v, f/c, dysuria, contractions,   VD, VB, round ligament pain, headaches       Objective:     Vitals:    22 1131   BP: 97/64   Pulse: 100     Wt Readings from Last 3 Encounters:   22 55.8 kg (123 lb)   22 54.9 kg (121 lb)   22 55.3 kg (122 lb)     nad  NCAT  pupils normal size  Skin nml no rashes or lesions  No resp distress, resp even and unlabored   nt, nd,  no rebound no guarding  No cyanosis or clubbing, edema appropriate for pregn  FHT: 150s   Assessment:        1. Encounter for supervision of other normal pregnancy in third trimester                Plan:      Continue atarax   o'sul encouraged   tdap declined   Encouraged PNV  Pain, fever, bleeding precautions   RTC 2 weeks

## 2022-07-28 ENCOUNTER — TELEPHONE (OUTPATIENT)
Dept: OBSTETRICS AND GYNECOLOGY | Facility: CLINIC | Age: 27
End: 2022-07-28
Payer: MEDICAID

## 2022-07-28 NOTE — TELEPHONE ENCOUNTER
----- Message from Misty Brown sent at 7/28/2022  9:12 AM CDT -----  Contact: self  Requesting a call back regarding patient advice - she is 31 weeks pregnant and says since Sunday she gets a shooting pain across her breast when she inhales - says it's happened 4x since Sunday. Please call back at [phone]

## 2022-07-28 NOTE — TELEPHONE ENCOUNTER
Pt c/o having a headache and body aches, mom has been sick. I advised her that Dr. Sandhu suggest to go to labor and deliver to have a heart rate check and a EKG. Pt states she will go after she gets off work.

## 2022-07-29 ENCOUNTER — PATIENT MESSAGE (OUTPATIENT)
Dept: OBSTETRICS AND GYNECOLOGY | Facility: CLINIC | Age: 27
End: 2022-07-29
Payer: MEDICAID

## 2022-07-29 DIAGNOSIS — R07.1 CHEST PAIN ON BREATHING: Primary | ICD-10-CM

## 2022-07-29 NOTE — TELEPHONE ENCOUNTER
I called pt and let her know that we ordered the EKG and Xray. To go to admitting in the back of the hospital where it says day surgery.

## 2022-08-01 ENCOUNTER — ROUTINE PRENATAL (OUTPATIENT)
Dept: OBSTETRICS AND GYNECOLOGY | Facility: CLINIC | Age: 27
End: 2022-08-01
Payer: MEDICAID

## 2022-08-01 VITALS
DIASTOLIC BLOOD PRESSURE: 66 MMHG | SYSTOLIC BLOOD PRESSURE: 98 MMHG | WEIGHT: 128 LBS | HEART RATE: 105 BPM | BODY MASS INDEX: 23.41 KG/M2

## 2022-08-01 DIAGNOSIS — Z34.83 ENCOUNTER FOR SUPERVISION OF OTHER NORMAL PREGNANCY IN THIRD TRIMESTER: Primary | ICD-10-CM

## 2022-08-01 DIAGNOSIS — R07.1 CHEST PAIN ON BREATHING: ICD-10-CM

## 2022-08-01 PROCEDURE — 99213 PR OFFICE/OUTPT VISIT, EST, LEVL III, 20-29 MIN: ICD-10-PCS | Mod: TH,S$GLB,, | Performed by: OBSTETRICS & GYNECOLOGY

## 2022-08-01 PROCEDURE — 99213 OFFICE O/P EST LOW 20 MIN: CPT | Mod: TH,S$GLB,, | Performed by: OBSTETRICS & GYNECOLOGY

## 2022-08-01 NOTE — PROGRESS NOTES
Subjective:       Patient ID: Dory Armas is a 27 y.o.  at 32w2d      Chief Complaint:  Routine Prenatal Visit      History of Present Illness  C/o headaches and neck pain. Also has been having sharp chest pain on the left side. Always located in the same place, worse with movement and deep breaths.. Labs and history reviewed with pt.         Review of Systems  Denies n/v, f/c, dysuria, contractions,   VD, VB, round ligament pain,      Objective:     Vitals:    22 1055   BP: 98/66   Pulse: 105     Wt Readings from Last 3 Encounters:   22 58.1 kg (128 lb)   22 55.8 kg (123 lb)   22 54.9 kg (121 lb)     nad  NCAT  pupils normal size  Skin nml no rashes or lesions  No resp distress, resp even and unlabored   nt, nd,  no rebound no guarding  No cyanosis or clubbing, edema appropriate for pregn  FHT: 150s   Assessment:        1. Encounter for supervision of other normal pregnancy in third trimester    2. Chest pain on breathing                Plan:      Continue atarax   Recommend heating pad or icy hot for chest pain   cxr and ekg normal   Pt given reassurance- encouraged stretches   tdap declined   Encouraged PNV  Pain, fever, bleeding precautions   RTC 2 weeks

## 2022-08-03 DIAGNOSIS — Z34.83 ENCOUNTER FOR SUPERVISION OF OTHER NORMAL PREGNANCY IN THIRD TRIMESTER: Primary | ICD-10-CM

## 2022-08-05 ENCOUNTER — PROCEDURE VISIT (OUTPATIENT)
Dept: OBSTETRICS AND GYNECOLOGY | Facility: CLINIC | Age: 27
End: 2022-08-05
Payer: MEDICAID

## 2022-08-05 DIAGNOSIS — Z34.83 ENCOUNTER FOR SUPERVISION OF OTHER NORMAL PREGNANCY IN THIRD TRIMESTER: ICD-10-CM

## 2022-08-05 PROCEDURE — 76816 OB US FOLLOW-UP PER FETUS: CPT | Mod: S$GLB,,, | Performed by: OBSTETRICS & GYNECOLOGY

## 2022-08-05 PROCEDURE — 76816 US OB/GYN PROCEDURE (VIEWPOINT): ICD-10-PCS | Mod: S$GLB,,, | Performed by: OBSTETRICS & GYNECOLOGY

## 2022-08-19 ENCOUNTER — ROUTINE PRENATAL (OUTPATIENT)
Dept: OBSTETRICS AND GYNECOLOGY | Facility: CLINIC | Age: 27
End: 2022-08-19
Payer: MEDICAID

## 2022-08-19 VITALS
BODY MASS INDEX: 24.33 KG/M2 | HEART RATE: 101 BPM | SYSTOLIC BLOOD PRESSURE: 106 MMHG | DIASTOLIC BLOOD PRESSURE: 73 MMHG | WEIGHT: 133 LBS

## 2022-08-19 DIAGNOSIS — Z34.83 ENCOUNTER FOR SUPERVISION OF OTHER NORMAL PREGNANCY IN THIRD TRIMESTER: Primary | ICD-10-CM

## 2022-08-19 DIAGNOSIS — O36.5910 POOR FETAL GROWTH AFFECTING MANAGEMENT OF MOTHER IN FIRST TRIMESTER, SINGLE OR UNSPECIFIED FETUS: ICD-10-CM

## 2022-08-19 PROCEDURE — 99213 PR OFFICE/OUTPT VISIT, EST, LEVL III, 20-29 MIN: ICD-10-PCS | Mod: TH,S$GLB,, | Performed by: OBSTETRICS & GYNECOLOGY

## 2022-08-19 PROCEDURE — 99213 OFFICE O/P EST LOW 20 MIN: CPT | Mod: TH,S$GLB,, | Performed by: OBSTETRICS & GYNECOLOGY

## 2022-08-19 NOTE — PROGRESS NOTES
Subjective:       Patient ID: Dory Armas is a 27 y.o.  at 34w6d     Chief Complaint:  Routine Prenatal Visit      History of Present Illness  No complaints. Body aches improved with magnesium. Labs and history reviewed with pt.         Review of Systems  Denies n/v, f/c, dysuria, contractions,   VD, VB, round ligament pain, headaches       Objective:     Vitals:    22 1102   BP: 106/73   Pulse: 101     Wt Readings from Last 3 Encounters:   22 60.3 kg (133 lb)   22 58.1 kg (128 lb)   22 55.8 kg (123 lb)     nad  NCAT  pupils normal size  Skin nml no rashes or lesions  No resp distress, resp even and unlabored   nt, nd,  no rebound no guarding  No cyanosis or clubbing, edema appropriate for pregn  FHT: 150s   Assessment:        1. Encounter for supervision of other normal pregnancy in third trimester    2. Poor fetal growth affecting management of mother in first trimester, single or unspecified fetus                Plan:      GBS today   cvx /high    Encouraged PNV  Pain, fever, bleeding precautions   RTC 1 weeks

## 2022-08-23 LAB
GROUP B STREP MOLECULAR: POSITIVE
PENICILLIN ALLERGIC: NO

## 2022-08-25 ENCOUNTER — ROUTINE PRENATAL (OUTPATIENT)
Dept: OBSTETRICS AND GYNECOLOGY | Facility: CLINIC | Age: 27
End: 2022-08-25
Payer: MEDICAID

## 2022-08-25 VITALS
DIASTOLIC BLOOD PRESSURE: 66 MMHG | WEIGHT: 135 LBS | SYSTOLIC BLOOD PRESSURE: 94 MMHG | BODY MASS INDEX: 24.69 KG/M2 | HEART RATE: 88 BPM

## 2022-08-25 DIAGNOSIS — Z34.83 ENCOUNTER FOR SUPERVISION OF OTHER NORMAL PREGNANCY IN THIRD TRIMESTER: ICD-10-CM

## 2022-08-25 DIAGNOSIS — O36.5910 POOR FETAL GROWTH AFFECTING MANAGEMENT OF MOTHER IN FIRST TRIMESTER, SINGLE OR UNSPECIFIED FETUS: Primary | ICD-10-CM

## 2022-08-25 PROCEDURE — 99213 PR OFFICE/OUTPT VISIT, EST, LEVL III, 20-29 MIN: ICD-10-PCS | Mod: TH,S$GLB,, | Performed by: OBSTETRICS & GYNECOLOGY

## 2022-08-25 PROCEDURE — 99213 OFFICE O/P EST LOW 20 MIN: CPT | Mod: TH,S$GLB,, | Performed by: OBSTETRICS & GYNECOLOGY

## 2022-08-25 NOTE — PROGRESS NOTES
Subjective:       Patient ID: Dory Armas is a 27 y.o.  at 35w5d     Chief Complaint:  Routine Prenatal Visit      History of Present Illness  No complaints. Body aches improved with magnesium. Labs and history reviewed with pt.         Review of Systems  Denies n/v, f/c, dysuria, contractions,   VD, VB, round ligament pain, headaches       Objective:     Vitals:    22 1050   BP: 94/66   Pulse: 88     Wt Readings from Last 3 Encounters:   22 61.2 kg (135 lb)   22 60.3 kg (133 lb)   22 58.1 kg (128 lb)     nad  NCAT  pupils normal size  Skin nml no rashes or lesions  No resp distress, resp even and unlabored   nt, nd,  no rebound no guarding  No cyanosis or clubbing, edema appropriate for pregn  FHT: 150s   Assessment:        1. Poor fetal growth affecting management of mother in first trimester, single or unspecified fetus    2. Encounter for supervision of other normal pregnancy in third trimester                Plan:      GBS pos   cvx //high    Encouraged PNV  Pain, fever, bleeding precautions   RTC 1 weeks   , RTC tomorrow for bpp

## 2022-08-26 ENCOUNTER — PROCEDURE VISIT (OUTPATIENT)
Dept: OBSTETRICS AND GYNECOLOGY | Facility: CLINIC | Age: 27
End: 2022-08-26
Payer: MEDICAID

## 2022-08-26 DIAGNOSIS — O36.5910 POOR FETAL GROWTH AFFECTING MANAGEMENT OF MOTHER IN FIRST TRIMESTER, SINGLE OR UNSPECIFIED FETUS: Primary | ICD-10-CM

## 2022-08-26 DIAGNOSIS — O36.5910 POOR FETAL GROWTH AFFECTING MANAGEMENT OF MOTHER IN FIRST TRIMESTER, SINGLE OR UNSPECIFIED FETUS: ICD-10-CM

## 2022-08-26 PROCEDURE — 76820 UMBILICAL ARTERY ECHO: CPT | Mod: S$GLB,,, | Performed by: OBSTETRICS & GYNECOLOGY

## 2022-08-26 PROCEDURE — 76819 US OB/GYN PROCEDURE (VIEWPOINT): ICD-10-PCS | Mod: S$GLB,,, | Performed by: OBSTETRICS & GYNECOLOGY

## 2022-08-26 PROCEDURE — 76819 FETAL BIOPHYS PROFIL W/O NST: CPT | Mod: S$GLB,,, | Performed by: OBSTETRICS & GYNECOLOGY

## 2022-08-26 PROCEDURE — 76816 US OB/GYN PROCEDURE (VIEWPOINT): ICD-10-PCS | Mod: S$GLB,,, | Performed by: OBSTETRICS & GYNECOLOGY

## 2022-08-26 PROCEDURE — 76820 US OB/GYN PROCEDURE (VIEWPOINT): ICD-10-PCS | Mod: S$GLB,,, | Performed by: OBSTETRICS & GYNECOLOGY

## 2022-08-26 PROCEDURE — 76816 OB US FOLLOW-UP PER FETUS: CPT | Mod: S$GLB,,, | Performed by: OBSTETRICS & GYNECOLOGY

## 2022-09-01 ENCOUNTER — ROUTINE PRENATAL (OUTPATIENT)
Dept: OBSTETRICS AND GYNECOLOGY | Facility: CLINIC | Age: 27
End: 2022-09-01
Payer: MEDICAID

## 2022-09-01 VITALS
BODY MASS INDEX: 24.69 KG/M2 | SYSTOLIC BLOOD PRESSURE: 93 MMHG | DIASTOLIC BLOOD PRESSURE: 47 MMHG | WEIGHT: 135 LBS | HEART RATE: 102 BPM

## 2022-09-01 DIAGNOSIS — O47.03 THREATENED PREMATURE LABOR IN THIRD TRIMESTER: ICD-10-CM

## 2022-09-01 DIAGNOSIS — O36.5910 POOR FETAL GROWTH AFFECTING MANAGEMENT OF MOTHER IN FIRST TRIMESTER, SINGLE OR UNSPECIFIED FETUS: Primary | ICD-10-CM

## 2022-09-01 PROCEDURE — 99213 OFFICE O/P EST LOW 20 MIN: CPT | Mod: TH,S$GLB,, | Performed by: OBSTETRICS & GYNECOLOGY

## 2022-09-01 PROCEDURE — 99213 PR OFFICE/OUTPT VISIT, EST, LEVL III, 20-29 MIN: ICD-10-PCS | Mod: TH,S$GLB,, | Performed by: OBSTETRICS & GYNECOLOGY

## 2022-09-01 NOTE — LETTER
2022      [facility] [facility] - OB GYN  [address]  [place] [address]  Phone: [phone]  Fax: [phone]       Patient: Dory Armas   YOB: 1995  Date of Visit: 2022    To Whom It May Concern:    Caitie Armas  was at [facility] on 2022. I am putting patient on part time duty due to threatened  labor.Patient can work 3 times a week. If you have any questions or concerns, or if I can be of further assistance, please do not hesitate to contact me.    Sincerely,    Dr. Adrienne Sandhu MD

## 2022-09-02 NOTE — PROGRESS NOTES
Subjective:       Patient ID: Dory Armas is a 27 y.o.  at 36w5d     Chief Complaint:  Routine Prenatal Visit      History of Present Illness  +cxts daily.  Labs and history reviewed with pt.         Review of Systems  Denies n/v, f/c, dysuria, contractions,   VD, VB, round ligament pain, headaches       Objective:     Vitals:    22 1102   BP: (!) 93/47   Pulse: 102     Wt Readings from Last 3 Encounters:   22 61.2 kg (135 lb)   22 61.2 kg (135 lb)   22 60.3 kg (133 lb)     nad  NCAT  pupils normal size  Skin nml no rashes or lesions  No resp distress, resp even and unlabored   nt, nd,  no rebound no guarding  No cyanosis or clubbing, edema appropriate for pregn  FHT: 150s   Cvx: 2/th/high   Assessment:        No diagnosis found.            Plan:      GBS pos   cvx 2/th/high    Encouraged PNV  Pain, fever, bleeding precautions   RTC 1 weeks

## 2022-09-07 ENCOUNTER — PATIENT MESSAGE (OUTPATIENT)
Dept: OBSTETRICS AND GYNECOLOGY | Facility: CLINIC | Age: 27
End: 2022-09-07
Payer: MEDICAID

## 2022-09-08 ENCOUNTER — ROUTINE PRENATAL (OUTPATIENT)
Dept: OBSTETRICS AND GYNECOLOGY | Facility: CLINIC | Age: 27
End: 2022-09-08
Payer: MEDICAID

## 2022-09-08 VITALS
DIASTOLIC BLOOD PRESSURE: 75 MMHG | HEART RATE: 134 BPM | SYSTOLIC BLOOD PRESSURE: 108 MMHG | WEIGHT: 136 LBS | BODY MASS INDEX: 24.87 KG/M2

## 2022-09-08 DIAGNOSIS — Z11.9 SCREENING EXAMINATION FOR INFECTIOUS DISEASE: Primary | ICD-10-CM

## 2022-09-08 PROCEDURE — 99213 OFFICE O/P EST LOW 20 MIN: CPT | Mod: TH,S$GLB,, | Performed by: OBSTETRICS & GYNECOLOGY

## 2022-09-08 PROCEDURE — 99213 PR OFFICE/OUTPT VISIT, EST, LEVL III, 20-29 MIN: ICD-10-PCS | Mod: TH,S$GLB,, | Performed by: OBSTETRICS & GYNECOLOGY

## 2022-09-08 NOTE — PROGRESS NOTES
Subjective:       Patient ID: Dory Armas is a 27 y.o.  at 37w5d     Chief Complaint:  Routine Prenatal Visit      History of Present Illness  +cxts daily.  Labs and history reviewed with pt.         Review of Systems  Denies n/v, f/c, dysuria, contractions,   VD, VB, round ligament pain, headaches       Objective:     Vitals:    22 1116   BP: 108/75   Pulse: (!) 134     Wt Readings from Last 3 Encounters:   22 61.7 kg (136 lb)   22 61.2 kg (135 lb)   22 61.2 kg (135 lb)     nad  NCAT  pupils normal size  Skin nml no rashes or lesions  No resp distress, resp even and unlabored   nt, nd,  no rebound no guarding  No cyanosis or clubbing, edema appropriate for pregn  FHT: 150s   Cvx: 2//high   Assessment:        1. Screening examination for infectious disease                Plan:      GBS pos   cvx 2//high    Encouraged PNV  Pain, fever, bleeding precautions   RTC 1 weeks

## 2022-09-12 ENCOUNTER — ROUTINE PRENATAL (OUTPATIENT)
Dept: OBSTETRICS AND GYNECOLOGY | Facility: CLINIC | Age: 27
End: 2022-09-12
Payer: MEDICAID

## 2022-09-12 ENCOUNTER — OUTSIDE PLACE OF SERVICE (OUTPATIENT)
Dept: OBSTETRICS AND GYNECOLOGY | Facility: CLINIC | Age: 27
End: 2022-09-12

## 2022-09-12 VITALS
HEART RATE: 84 BPM | SYSTOLIC BLOOD PRESSURE: 105 MMHG | DIASTOLIC BLOOD PRESSURE: 68 MMHG | BODY MASS INDEX: 24.69 KG/M2 | WEIGHT: 135 LBS

## 2022-09-12 DIAGNOSIS — O36.5910 POOR FETAL GROWTH AFFECTING MANAGEMENT OF MOTHER IN FIRST TRIMESTER, SINGLE OR UNSPECIFIED FETUS: Primary | ICD-10-CM

## 2022-09-12 LAB
APPEARANCE, UA: CLEAR
BACTERIA SPEC CULT: ABNORMAL /HPF
BARBITURATE SCREEN URINE: NEGATIVE
BASOPHILS NFR BLD: 0.2 % (ref 0–3)
BENZODIAZ UR QL SCN: NEGATIVE
BENZOYLECGONINE, URINE: NEGATIVE
BILIRUB UR QL STRIP: NEGATIVE MG/DL
CANNABINOID SCREEN URINE: NEGATIVE
CHLAMYDIA: NEGATIVE
CLARITHRO TITR SBT: NEGATIVE {TITER}
COLOR UR: ABNORMAL
EOSINOPHIL NFR BLD: 0.5 % (ref 1–3)
ERYTHROCYTE [DISTWIDTH] IN BLOOD BY AUTOMATED COUNT: 13.5 % (ref 12.5–18)
GLUCOSE (UA): NORMAL MG/DL
GONORRHEA: NEGATIVE
HBV SURFACE AG SERPL QL IA: NONREACTIVE
HCT VFR BLD AUTO: 33 % (ref 37–47)
HGB BLD-MCNC: 11.2 G/DL (ref 12–16)
HGB UR QL STRIP: NEGATIVE /UL
HIV-1 AND HIV-2 ANTIBODIES: NEGATIVE
KETONES UR QL STRIP: NEGATIVE MG/DL
LEUKOCYTE ESTERASE UR QL STRIP: 25 /UL
LYMPHOCYTES NFR BLD: 20 % (ref 25–40)
MCH RBC QN AUTO: 30.4 PG (ref 27–31.2)
MCHC RBC AUTO-ENTMCNC: 33.9 G/DL (ref 31.8–35.4)
MCV RBC AUTO: 89.7 FL (ref 80–97)
METHADONE UR QL SCN: NEGATIVE
MONOCYTES NFR BLD: 7.3 % (ref 1–15)
NEUTROPHILS # BLD AUTO: 6.3 10*3/UL (ref 1.8–7.7)
NEUTROPHILS NFR BLD: 71.2 % (ref 37–80)
NITRITE UR QL STRIP: NEGATIVE
NUCLEATED RED BLOOD CELLS: 0 %
OPIATES UR QL SCN: NEGATIVE
PCP UR QL SCN: NEGATIVE
PH UR STRIP: 7 PH (ref 5–9)
PH, URINE (TOX): 7 (ref 5–8)
PLATELETS: 348 10*3/UL (ref 142–424)
PROT UR QL STRIP: NEGATIVE MG/DL
RBC # BLD AUTO: 3.68 10*6/UL (ref 4.2–5.4)
RBC #/AREA URNS HPF: ABNORMAL /HPF (ref 0–2)
RPR: NON REACTIVE
SERVICE COMMENT 03: ABNORMAL
SOURCE: NORMAL
SOURCE: NORMAL
SP GR UR STRIP: 1.01 (ref 1–1.03)
SPECIMEN COLLECTION METHOD, URINE: ABNORMAL
SQUAMOUS EPITHELIAL, UA: ABNORMAL /LPF
TRICHOMONAS AMPLIFIED: NEGATIVE
UROBILINOGEN UR STRIP-ACNC: NORMAL MG/DL
WBC # BLD: 8.9 10*3/UL (ref 4.6–10.2)
WBC #/AREA URNS HPF: ABNORMAL /HPF (ref 0–5)

## 2022-09-12 PROCEDURE — 99213 OFFICE O/P EST LOW 20 MIN: CPT | Mod: TH,S$GLB,, | Performed by: OBSTETRICS & GYNECOLOGY

## 2022-09-12 PROCEDURE — 59409 OBSTETRICAL CARE: CPT | Mod: AT,,, | Performed by: OBSTETRICS & GYNECOLOGY

## 2022-09-12 PROCEDURE — 59409 PR OBSTETRICAL CARE,VAG DELIV ONLY: ICD-10-PCS | Mod: AT,,, | Performed by: OBSTETRICS & GYNECOLOGY

## 2022-09-12 PROCEDURE — 99213 PR OFFICE/OUTPT VISIT, EST, LEVL III, 20-29 MIN: ICD-10-PCS | Mod: TH,S$GLB,, | Performed by: OBSTETRICS & GYNECOLOGY

## 2022-09-12 NOTE — PROGRESS NOTES
Leuk:neg  Nitrates:neg  Ketones:neg  Blood:neg     Progress Notes by Tony Arreola DO at 11/02/18 03:08 PM     Author:  Tony Arreola DO Service:  (none) Author Type:  Physician     Filed:  11/02/18 03:09 PM Encounter Date:  11/2/2018 Status:  Signed     :  Tony Arreola DO (Physician)            BACK PAIN FOLLOW-UP    Yesica is a 42 year old female who is a patient of Angel Hanson MD, is here for follow-up on back pain which has been present for several months.  The patient reports that the pain is improved from last visit.  It does not radiate into the arms or legs. It is not associated with weakness or paresthesias in the arms or legs.  There are not bladder and bowel changes noted.  Pt is heating and stretching.   Previous Imaging:  none  Current/past treatments:  None   PMHX:  scoliosis  Past history of back surgery? No  Family history of back problems? No  Does patient exercise? Yes  Right hand dominant  Works as a customer service mgr.  Allergies:[BO1.1C] Review of patient's allergies indicates no known allergies.  Current Outpatient Prescriptions     Medication  Sig   • Norgestim-Eth Estrad Triphasic (TRINESSA, 28,) 0.18/0.215/0.25 MG-35 MCG TABS Take 1 Tab by mouth daily for 92 days.   • Hydroxychloroquine Sulfate (PLAQUENIL) 200 MG tablet TAKE 1 TABLET BY MOUTH DAILY   • Hydroxychloroquine Sulfate (PLAQUENIL) 200 MG tablet 1 daily   • sumatriptan (IMITREX) 100 MG tablet TAKE 1 TABLET BY MOUTH AT ONSET OF THE HEADACHE[BO1.1T]   PE:  General appearance: alert & oriented, pleasant and comfortable  Cervical Spine:  Tenderness in the high left areas.  Spasm is not present.  Tissue texture changes are present. C2Rl.  Thoracic Spine:  No tenderness.  Spasm is not present.  Tissue texture changes are present.  There is no segmental motion restrictions noted.  Gait is normal.  ASSESS AND PLAN:  Dx:[BO1.1C]    Encounter Diagnoses     Name  Primary?   • Somatic dysfunction of cervical region Yes[BO1.1T]   PLAN:  -OMT (see OMT note).  I ,  again,  feel pt would have some benefit from OMT. I again, described proceess and proceedure to Yesica at length who voiced understanding and agreed to proceed.  -Cont heating and stretching as previously percribed.  -Pt education.  Pt voices understanding with plan.  All questions were answered.  Procedure - Osteopathic Manipulation  Heat applied to the area for about 15 minutes prior to OMT.  Manipulation was performed to  the cervical spine- levels 1, 2 and 3 on the Bilateral using high velocity low amplitude, soft tissue release and myofacial release.  Post treatment the patient reports that the pain is Improved.  As usual, Yesica knows to call or contact this office urgently if she would develop any bowel or bladder control issues, weakness, paralysis, inability to walk, coordination changes, or any other neurologic changes.  Instructed to call or return to office if the problem worsens, if new signs or symptoms develop, or does not improve.  Follow-up in[BO1.1C] 2[BO1.1M] week[BO1.1C]s[BO1.1M]  for recheck and possible OMT.[BO1.1C]  Electronically Signed by:    Tony Arreola DO , 11/2/2018[BO1.1T]          Revision History        User Key Date/Time User Provider Type Action    > BO1.1 11/02/18 03:09 PM Tony Arreola DO Physician Sign    C - Copied, M - Manual, T - Template

## 2022-09-12 NOTE — PROGRESS NOTES
Subjective:       Patient ID: Dory Armas is a 27 y.o.  at 37w5d     Chief Complaint:  Routine Prenatal Visit      History of Present Illness  +cxts daily.  Labs and history reviewed with pt.         Review of Systems  Denies n/v, f/c, dysuria, contractions,   VD, VB, round ligament pain, headaches       Objective:     Vitals:    22 0800   BP: 105/68   Pulse: 84     Wt Readings from Last 3 Encounters:   22 61.2 kg (135 lb)   22 61.7 kg (136 lb)   22 61.2 kg (135 lb)     nad  NCAT  pupils normal size  Skin nml no rashes or lesions  No resp distress, resp even and unlabored   nt, nd,  no rebound no guarding  No cyanosis or clubbing, edema appropriate for pregn  FHT: 150s   Cvx: /high   Assessment:        No diagnosis found.            Plan:      GBS pos   cvx /high    Encouraged PNV  Pain, fever, bleeding precautions   RTC 1 weeks

## 2022-09-13 LAB
CELLS COUNTED: 100
EOSINOPHIL NFR BLD: 1 % (ref 1–3)
ERYTHROCYTE [DISTWIDTH] IN BLOOD BY AUTOMATED COUNT: 13.6 % (ref 12.5–18)
HCT VFR BLD AUTO: 31.6 % (ref 37–47)
HGB BLD-MCNC: 10.2 G/DL (ref 12–16)
LYMPHOCYTES NFR BLD: 19 % (ref 25–40)
MCH RBC QN AUTO: 29.8 PG (ref 27–31.2)
MCHC RBC AUTO-ENTMCNC: 32.3 G/DL (ref 31.8–35.4)
MCV RBC AUTO: 92.4 FL (ref 80–97)
MONOCYTES NFR BLD: 6 % (ref 1–15)
NEUTROPHILS # BLD AUTO: 11.9 10*3/UL (ref 1.8–7.7)
NEUTROPHILS NFR BLD: 74 % (ref 37–80)
NUCLEATED RED BLOOD CELLS: 0 %
PLATELETS: 322 10*3/UL (ref 142–424)
RBC # BLD AUTO: 3.42 10*6/UL (ref 4.2–5.4)
RBC MORPH BLD: ABNORMAL
RUBELLA IGG: 19 IU/ML
SMALL PLATELETS BLD QL SMEAR: NORMAL
WBC # BLD: 16.1 10*3/UL (ref 4.6–10.2)

## 2022-09-14 ENCOUNTER — PATIENT MESSAGE (OUTPATIENT)
Dept: OBSTETRICS AND GYNECOLOGY | Facility: CLINIC | Age: 27
End: 2022-09-14
Payer: MEDICAID

## 2022-09-14 PROCEDURE — 99238 HOSP IP/OBS DSCHRG MGMT 30/<: CPT | Mod: ,,, | Performed by: OBSTETRICS & GYNECOLOGY

## 2022-09-14 PROCEDURE — 99238 PR HOSPITAL DISCHARGE DAY,<30 MIN: ICD-10-PCS | Mod: ,,, | Performed by: OBSTETRICS & GYNECOLOGY

## 2022-09-21 ENCOUNTER — TELEPHONE (OUTPATIENT)
Dept: OBSTETRICS AND GYNECOLOGY | Facility: CLINIC | Age: 27
End: 2022-09-21
Payer: MEDICAID

## 2022-09-21 ENCOUNTER — PATIENT MESSAGE (OUTPATIENT)
Dept: OBSTETRICS AND GYNECOLOGY | Facility: CLINIC | Age: 27
End: 2022-09-21
Payer: MEDICAID

## 2022-09-21 NOTE — TELEPHONE ENCOUNTER
Advised pt to go to urgent care or PCP to get tested. States that she has started bleeding again from delivery and it soaking a thin pad. Advised her that that is normal but if she is soaking a thick pad with in a hour to call us back. Pt verbalized understanding.

## 2022-09-21 NOTE — TELEPHONE ENCOUNTER
----- Message from Albina Gleason sent at 9/21/2022 10:13 AM CDT -----  pt states that she has cramp, chills, nausea and 102 fever, was around someone that has strep throat. just wants to make sure she doesn't have infection..[phone] (home)

## 2022-09-26 ENCOUNTER — PATIENT MESSAGE (OUTPATIENT)
Dept: OBSTETRICS AND GYNECOLOGY | Facility: CLINIC | Age: 27
End: 2022-09-26
Payer: MEDICAID

## 2022-09-26 RX ORDER — PAROXETINE HYDROCHLORIDE 20 MG/1
20 TABLET, FILM COATED ORAL EVERY MORNING
Qty: 30 TABLET | Refills: 11 | Status: SHIPPED | OUTPATIENT
Start: 2022-09-26 | End: 2023-09-26

## 2022-11-17 ENCOUNTER — PATIENT MESSAGE (OUTPATIENT)
Dept: OBSTETRICS AND GYNECOLOGY | Facility: CLINIC | Age: 27
End: 2022-11-17
Payer: MEDICAID

## 2023-01-09 ENCOUNTER — PATIENT MESSAGE (OUTPATIENT)
Dept: OBSTETRICS AND GYNECOLOGY | Facility: CLINIC | Age: 28
End: 2023-01-09
Payer: MEDICAID

## 2023-04-26 ENCOUNTER — PATIENT MESSAGE (OUTPATIENT)
Dept: OBSTETRICS AND GYNECOLOGY | Facility: CLINIC | Age: 28
End: 2023-04-26
Payer: MEDICAID

## 2023-05-11 ENCOUNTER — PATIENT MESSAGE (OUTPATIENT)
Dept: OBSTETRICS AND GYNECOLOGY | Facility: CLINIC | Age: 28
End: 2023-05-11
Payer: MEDICAID

## 2023-05-25 ENCOUNTER — PATIENT MESSAGE (OUTPATIENT)
Dept: OBSTETRICS AND GYNECOLOGY | Facility: CLINIC | Age: 28
End: 2023-05-25
Payer: MEDICAID

## 2023-06-05 ENCOUNTER — OFFICE VISIT (OUTPATIENT)
Dept: OBSTETRICS AND GYNECOLOGY | Facility: CLINIC | Age: 28
End: 2023-06-05
Payer: MEDICAID

## 2023-06-05 VITALS
DIASTOLIC BLOOD PRESSURE: 73 MMHG | HEART RATE: 93 BPM | WEIGHT: 124 LBS | SYSTOLIC BLOOD PRESSURE: 113 MMHG | BODY MASS INDEX: 22.68 KG/M2

## 2023-06-05 DIAGNOSIS — N94.6 DYSMENORRHEA: Primary | ICD-10-CM

## 2023-06-05 PROCEDURE — 1159F MED LIST DOCD IN RCRD: CPT | Mod: CPTII,S$GLB,, | Performed by: OBSTETRICS & GYNECOLOGY

## 2023-06-05 PROCEDURE — 99213 OFFICE O/P EST LOW 20 MIN: CPT | Mod: S$GLB,,, | Performed by: OBSTETRICS & GYNECOLOGY

## 2023-06-05 PROCEDURE — 3008F PR BODY MASS INDEX (BMI) DOCUMENTED: ICD-10-PCS | Mod: CPTII,S$GLB,, | Performed by: OBSTETRICS & GYNECOLOGY

## 2023-06-05 PROCEDURE — 99213 PR OFFICE/OUTPT VISIT, EST, LEVL III, 20-29 MIN: ICD-10-PCS | Mod: S$GLB,,, | Performed by: OBSTETRICS & GYNECOLOGY

## 2023-06-05 PROCEDURE — 3074F SYST BP LT 130 MM HG: CPT | Mod: CPTII,S$GLB,, | Performed by: OBSTETRICS & GYNECOLOGY

## 2023-06-05 PROCEDURE — 3008F BODY MASS INDEX DOCD: CPT | Mod: CPTII,S$GLB,, | Performed by: OBSTETRICS & GYNECOLOGY

## 2023-06-05 PROCEDURE — 3074F PR MOST RECENT SYSTOLIC BLOOD PRESSURE < 130 MM HG: ICD-10-PCS | Mod: CPTII,S$GLB,, | Performed by: OBSTETRICS & GYNECOLOGY

## 2023-06-05 PROCEDURE — 1159F PR MEDICATION LIST DOCUMENTED IN MEDICAL RECORD: ICD-10-PCS | Mod: CPTII,S$GLB,, | Performed by: OBSTETRICS & GYNECOLOGY

## 2023-06-05 PROCEDURE — 3078F DIAST BP <80 MM HG: CPT | Mod: CPTII,S$GLB,, | Performed by: OBSTETRICS & GYNECOLOGY

## 2023-06-05 PROCEDURE — 3078F PR MOST RECENT DIASTOLIC BLOOD PRESSURE < 80 MM HG: ICD-10-PCS | Mod: CPTII,S$GLB,, | Performed by: OBSTETRICS & GYNECOLOGY

## 2023-06-06 NOTE — PROGRESS NOTES
Subjective:       Patient ID: Dory Armas is a 28 y.o. female.    Chief Complaint:  Follow-up and mab      History of Present Illness  Pt here for painful cycles and heavy bleeing.  History and past labs reviewed with patient.  Reports having tubal ligation in 12/22 with Dr. Duran. Since then has had heavier cycles and pain on the right side.       Review of Systems  Review of Systems   Constitutional:  Negative for chills and fever.   Respiratory:  Negative for shortness of breath.    Cardiovascular:  Negative for chest pain.   Gastrointestinal:  Negative for abdominal pain, blood in stool, constipation, diarrhea, nausea, vomiting and reflux.   Genitourinary:  Negative for dysmenorrhea, dyspareunia, dysuria, hematuria, hot flashes, menorrhagia, menstrual problem, pelvic pain, vaginal bleeding, vaginal discharge, postcoital bleeding and vaginal dryness.   Musculoskeletal:  Negative for arthralgias and joint swelling.   Integumentary:  Negative for rash, hair changes, breast mass, nipple discharge and breast skin changes.   Psychiatric/Behavioral:  Negative for depression. The patient is not nervous/anxious.    Breast: Negative for asymmetry, lump, mass, nipple discharge and skin changes        Objective:     Vitals:    06/05/23 0947   BP: 113/73   Pulse: 93   Weight: 56.2 kg (124 lb)       Physical Exam:   Constitutional: She appears well-developed and well-nourished. No distress.    HENT:   Head: Normocephalic and atraumatic.    Eyes: Conjunctivae and EOM are normal.    Neck: No tracheal deviation present. No thyromegaly present.    Cardiovascular:       Exam reveals no clubbing, no cyanosis and no edema.        Pulmonary/Chest: Effort normal. No respiratory distress.        Abdominal: Soft. She exhibits no distension and no mass. There is no abdominal tenderness. There is no rebound and no guarding. No hernia.     Genitourinary:    Uterus and rectum normal.      Pelvic exam was performed with patient supine.    There is no rash, tenderness, lesion or injury on the right labia. There is no rash, tenderness, lesion or injury on the left labia. Cervix is normal. Right adnexum displays no mass, no tenderness and no fullness. Left adnexum displays no mass, no tenderness and no fullness. There is bleeding in the vagina.                Skin: She is not diaphoretic. No cyanosis. Nails show no clubbing.           Assessment:        1. Dysmenorrhea                Plan:      Pelvic US ordered   Requested surgical op report   F/u after US

## 2023-06-07 DIAGNOSIS — N94.6 DYSMENORRHEA: Primary | ICD-10-CM

## 2023-06-14 ENCOUNTER — PROCEDURE VISIT (OUTPATIENT)
Dept: OBSTETRICS AND GYNECOLOGY | Facility: CLINIC | Age: 28
End: 2023-06-14
Payer: MEDICAID

## 2023-06-14 DIAGNOSIS — N94.6 DYSMENORRHEA: ICD-10-CM

## 2023-06-14 PROCEDURE — 76856 US OB/GYN PROCEDURE (VIEWPOINT): ICD-10-PCS | Mod: S$GLB,,, | Performed by: OBSTETRICS & GYNECOLOGY

## 2023-06-14 PROCEDURE — 76856 US EXAM PELVIC COMPLETE: CPT | Mod: S$GLB,,, | Performed by: OBSTETRICS & GYNECOLOGY

## 2023-06-29 ENCOUNTER — PATIENT MESSAGE (OUTPATIENT)
Dept: OBSTETRICS AND GYNECOLOGY | Facility: CLINIC | Age: 28
End: 2023-06-29
Payer: MEDICAID

## 2023-07-08 DIAGNOSIS — F41.9 ANXIETY: ICD-10-CM

## 2023-07-10 RX ORDER — HYDROXYZINE HYDROCHLORIDE 50 MG/1
TABLET, FILM COATED ORAL
Qty: 60 TABLET | Refills: 0 | Status: SHIPPED | OUTPATIENT
Start: 2023-07-10 | End: 2023-07-26

## 2023-07-23 DIAGNOSIS — F41.9 ANXIETY: ICD-10-CM

## 2023-07-26 RX ORDER — HYDROXYZINE HYDROCHLORIDE 50 MG/1
TABLET, FILM COATED ORAL
Qty: 180 TABLET | Refills: 0 | Status: SHIPPED | OUTPATIENT
Start: 2023-07-26